# Patient Record
Sex: MALE | Race: WHITE | NOT HISPANIC OR LATINO | Employment: OTHER | URBAN - METROPOLITAN AREA
[De-identification: names, ages, dates, MRNs, and addresses within clinical notes are randomized per-mention and may not be internally consistent; named-entity substitution may affect disease eponyms.]

---

## 2017-07-12 ENCOUNTER — RECORDS - HEALTHEAST (OUTPATIENT)
Dept: LAB | Facility: CLINIC | Age: 62
End: 2017-07-12

## 2017-07-12 LAB
CHOLEST SERPL-MCNC: 129 MG/DL
FASTING STATUS PATIENT QL REPORTED: YES
HDLC SERPL-MCNC: 24 MG/DL
LDLC SERPL CALC-MCNC: 90 MG/DL
PSA SERPL-MCNC: 0.5 NG/ML (ref 0–4.5)
TRIGL SERPL-MCNC: 77 MG/DL

## 2018-08-08 ENCOUNTER — RECORDS - HEALTHEAST (OUTPATIENT)
Dept: LAB | Facility: CLINIC | Age: 63
End: 2018-08-08

## 2018-08-08 LAB
ALBUMIN SERPL-MCNC: 3.2 G/DL (ref 3.5–5)
ALP SERPL-CCNC: 156 U/L (ref 45–120)
ALT SERPL W P-5'-P-CCNC: 34 U/L (ref 0–45)
ANION GAP SERPL CALCULATED.3IONS-SCNC: 10 MMOL/L (ref 5–18)
AST SERPL W P-5'-P-CCNC: 24 U/L (ref 0–40)
BILIRUB SERPL-MCNC: 0.5 MG/DL (ref 0–1)
BUN SERPL-MCNC: 14 MG/DL (ref 8–22)
CALCIUM SERPL-MCNC: 9 MG/DL (ref 8.5–10.5)
CHLORIDE BLD-SCNC: 108 MMOL/L (ref 98–107)
CHOLEST SERPL-MCNC: 132 MG/DL
CO2 SERPL-SCNC: 23 MMOL/L (ref 22–31)
CREAT SERPL-MCNC: 0.74 MG/DL (ref 0.7–1.3)
FASTING STATUS PATIENT QL REPORTED: YES
GFR SERPL CREATININE-BSD FRML MDRD: >60 ML/MIN/1.73M2
GLUCOSE BLD-MCNC: 94 MG/DL (ref 70–125)
HDLC SERPL-MCNC: 24 MG/DL
LDLC SERPL CALC-MCNC: 95 MG/DL
POTASSIUM BLD-SCNC: 4.6 MMOL/L (ref 3.5–5)
PROT SERPL-MCNC: 5.8 G/DL (ref 6–8)
SODIUM SERPL-SCNC: 141 MMOL/L (ref 136–145)
TRIGL SERPL-MCNC: 65 MG/DL

## 2018-08-09 ENCOUNTER — RECORDS - HEALTHEAST (OUTPATIENT)
Dept: LAB | Facility: CLINIC | Age: 63
End: 2018-08-09

## 2018-08-09 LAB — GGT SERPL-CCNC: 14 U/L (ref 0–50)

## 2019-03-19 ENCOUNTER — RECORDS - HEALTHEAST (OUTPATIENT)
Dept: LAB | Facility: CLINIC | Age: 64
End: 2019-03-19

## 2019-03-19 LAB
ALBUMIN SERPL-MCNC: 2.7 G/DL (ref 3.5–5)
ALP SERPL-CCNC: 236 U/L (ref 45–120)
ALT SERPL W P-5'-P-CCNC: 133 U/L (ref 0–45)
ANION GAP SERPL CALCULATED.3IONS-SCNC: 8 MMOL/L (ref 5–18)
AST SERPL W P-5'-P-CCNC: 76 U/L (ref 0–40)
BILIRUB SERPL-MCNC: 0.4 MG/DL (ref 0–1)
BUN SERPL-MCNC: 11 MG/DL (ref 8–22)
C DIFF TOX B STL QL: NEGATIVE
CALCIUM SERPL-MCNC: 8.5 MG/DL (ref 8.5–10.5)
CHLORIDE BLD-SCNC: 104 MMOL/L (ref 98–107)
CHOLEST SERPL-MCNC: 97 MG/DL
CO2 SERPL-SCNC: 25 MMOL/L (ref 22–31)
CREAT SERPL-MCNC: 0.75 MG/DL (ref 0.7–1.3)
FASTING STATUS PATIENT QL REPORTED: ABNORMAL
GFR SERPL CREATININE-BSD FRML MDRD: >60 ML/MIN/1.73M2
GLUCOSE BLD-MCNC: 85 MG/DL (ref 70–125)
HDLC SERPL-MCNC: 17 MG/DL
LDLC SERPL CALC-MCNC: 66 MG/DL
MAGNESIUM SERPL-MCNC: 1.8 MG/DL (ref 1.8–2.6)
POTASSIUM BLD-SCNC: 4.7 MMOL/L (ref 3.5–5)
PROT SERPL-MCNC: 5.2 G/DL (ref 6–8)
PSA SERPL-MCNC: 0.5 NG/ML (ref 0–4.5)
RIBOTYPE 027/NAP1/BI: NORMAL
SODIUM SERPL-SCNC: 137 MMOL/L (ref 136–145)
TRIGL SERPL-MCNC: 70 MG/DL

## 2019-03-20 LAB
O+P STL MICRO: NORMAL
SHIGA TOXIN 1: NEGATIVE
SHIGA TOXIN 2: NEGATIVE

## 2019-03-21 LAB
H PYLORI AG STL QL IA: NORMAL
REPORT STATUS: NORMAL
SPECIMEN DESCRIPTION: NORMAL

## 2019-03-22 LAB — BACTERIA SPEC CULT: NORMAL

## 2020-09-10 ENCOUNTER — RECORDS - HEALTHEAST (OUTPATIENT)
Dept: LAB | Facility: CLINIC | Age: 65
End: 2020-09-10

## 2020-09-10 LAB
ALBUMIN SERPL-MCNC: 2.8 G/DL (ref 3.5–5)
ALP SERPL-CCNC: 215 U/L (ref 45–120)
ALT SERPL W P-5'-P-CCNC: 49 U/L (ref 0–45)
ANION GAP SERPL CALCULATED.3IONS-SCNC: 8 MMOL/L (ref 5–18)
AST SERPL W P-5'-P-CCNC: 37 U/L (ref 0–40)
BILIRUB SERPL-MCNC: 0.3 MG/DL (ref 0–1)
BUN SERPL-MCNC: 13 MG/DL (ref 8–22)
CALCIUM SERPL-MCNC: 7.8 MG/DL (ref 8.5–10.5)
CHLORIDE BLD-SCNC: 106 MMOL/L (ref 98–107)
CO2 SERPL-SCNC: 22 MMOL/L (ref 22–31)
CREAT SERPL-MCNC: 0.69 MG/DL (ref 0.7–1.3)
GFR SERPL CREATININE-BSD FRML MDRD: >60 ML/MIN/1.73M2
GLUCOSE BLD-MCNC: 81 MG/DL (ref 70–125)
LIPASE SERPL-CCNC: 25 U/L (ref 0–52)
POTASSIUM BLD-SCNC: 4.4 MMOL/L (ref 3.5–5)
PROT SERPL-MCNC: 5.5 G/DL (ref 6–8)
PSA SERPL-MCNC: 0.4 NG/ML (ref 0–4.5)
SODIUM SERPL-SCNC: 136 MMOL/L (ref 136–145)

## 2020-09-11 ENCOUNTER — RECORDS - HEALTHEAST (OUTPATIENT)
Dept: LAB | Facility: CLINIC | Age: 65
End: 2020-09-11

## 2020-09-11 LAB
CHOLEST SERPL-MCNC: 115 MG/DL
FASTING STATUS PATIENT QL REPORTED: ABNORMAL
HDLC SERPL-MCNC: 29 MG/DL
LDLC SERPL CALC-MCNC: 78 MG/DL
TRIGL SERPL-MCNC: 42 MG/DL

## 2021-05-30 ENCOUNTER — RECORDS - HEALTHEAST (OUTPATIENT)
Dept: ADMINISTRATIVE | Facility: CLINIC | Age: 66
End: 2021-05-30

## 2021-09-14 ENCOUNTER — LAB REQUISITION (OUTPATIENT)
Dept: LAB | Facility: CLINIC | Age: 66
End: 2021-09-14
Payer: COMMERCIAL

## 2021-09-14 DIAGNOSIS — E78.5 HYPERLIPIDEMIA, UNSPECIFIED: ICD-10-CM

## 2021-09-14 DIAGNOSIS — Z12.5 ENCOUNTER FOR SCREENING FOR MALIGNANT NEOPLASM OF PROSTATE: ICD-10-CM

## 2021-09-14 DIAGNOSIS — I10 ESSENTIAL (PRIMARY) HYPERTENSION: ICD-10-CM

## 2021-09-14 LAB
ALBUMIN SERPL-MCNC: 2.8 G/DL (ref 3.5–5)
ALP SERPL-CCNC: 179 U/L (ref 45–120)
ALT SERPL W P-5'-P-CCNC: 36 U/L (ref 0–45)
ANION GAP SERPL CALCULATED.3IONS-SCNC: 8 MMOL/L (ref 5–18)
AST SERPL W P-5'-P-CCNC: 30 U/L (ref 0–40)
BILIRUB SERPL-MCNC: 0.3 MG/DL (ref 0–1)
BUN SERPL-MCNC: 8 MG/DL (ref 8–22)
CALCIUM SERPL-MCNC: 8 MG/DL (ref 8.5–10.5)
CHLORIDE BLD-SCNC: 106 MMOL/L (ref 98–107)
CHOLEST SERPL-MCNC: 125 MG/DL
CO2 SERPL-SCNC: 25 MMOL/L (ref 22–31)
CREAT SERPL-MCNC: 0.73 MG/DL (ref 0.7–1.3)
GFR SERPL CREATININE-BSD FRML MDRD: >90 ML/MIN/1.73M2
GLUCOSE BLD-MCNC: 90 MG/DL (ref 70–125)
HDLC SERPL-MCNC: 35 MG/DL
LDLC SERPL CALC-MCNC: 82 MG/DL
POTASSIUM BLD-SCNC: 4.4 MMOL/L (ref 3.5–5)
PROT SERPL-MCNC: 5.5 G/DL (ref 6–8)
PSA SERPL-MCNC: 0.62 UG/L (ref 0–4.5)
SODIUM SERPL-SCNC: 139 MMOL/L (ref 136–145)
TRIGL SERPL-MCNC: 41 MG/DL

## 2021-09-14 PROCEDURE — 36415 COLL VENOUS BLD VENIPUNCTURE: CPT | Mod: ORL | Performed by: NURSE PRACTITIONER

## 2021-09-14 PROCEDURE — 80061 LIPID PANEL: CPT | Mod: ORL | Performed by: NURSE PRACTITIONER

## 2021-09-14 PROCEDURE — 80053 COMPREHEN METABOLIC PANEL: CPT | Mod: ORL | Performed by: NURSE PRACTITIONER

## 2021-09-14 PROCEDURE — G0103 PSA SCREENING: HCPCS | Mod: ORL | Performed by: NURSE PRACTITIONER

## 2022-09-09 ENCOUNTER — LAB REQUISITION (OUTPATIENT)
Dept: LAB | Facility: CLINIC | Age: 67
End: 2022-09-09
Payer: COMMERCIAL

## 2022-09-09 DIAGNOSIS — R05.1 ACUTE COUGH: ICD-10-CM

## 2022-09-09 LAB — SARS-COV-2 RNA RESP QL NAA+PROBE: NEGATIVE

## 2022-09-09 PROCEDURE — 87081 CULTURE SCREEN ONLY: CPT | Mod: ORL | Performed by: PHYSICIAN ASSISTANT

## 2022-09-09 PROCEDURE — U0003 INFECTIOUS AGENT DETECTION BY NUCLEIC ACID (DNA OR RNA); SEVERE ACUTE RESPIRATORY SYNDROME CORONAVIRUS 2 (SARS-COV-2) (CORONAVIRUS DISEASE [COVID-19]), AMPLIFIED PROBE TECHNIQUE, MAKING USE OF HIGH THROUGHPUT TECHNOLOGIES AS DESCRIBED BY CMS-2020-01-R: HCPCS | Mod: ORL | Performed by: PHYSICIAN ASSISTANT

## 2022-09-11 LAB — BACTERIA SPEC CULT: NORMAL

## 2022-09-20 ENCOUNTER — LAB REQUISITION (OUTPATIENT)
Dept: LAB | Facility: CLINIC | Age: 67
End: 2022-09-20
Payer: COMMERCIAL

## 2022-09-20 ENCOUNTER — TRANSFERRED RECORDS (OUTPATIENT)
Dept: HEALTH INFORMATION MANAGEMENT | Facility: CLINIC | Age: 67
End: 2022-09-20

## 2022-09-20 DIAGNOSIS — Z12.5 ENCOUNTER FOR SCREENING FOR MALIGNANT NEOPLASM OF PROSTATE: ICD-10-CM

## 2022-09-20 DIAGNOSIS — E78.5 HYPERLIPIDEMIA, UNSPECIFIED: ICD-10-CM

## 2022-09-20 LAB
ALBUMIN SERPL BCG-MCNC: 3.6 G/DL (ref 3.5–5.2)
ALP SERPL-CCNC: 140 U/L (ref 40–129)
ALT SERPL W P-5'-P-CCNC: 22 U/L (ref 10–50)
ANION GAP SERPL CALCULATED.3IONS-SCNC: 6 MMOL/L (ref 7–15)
AST SERPL W P-5'-P-CCNC: 18 U/L (ref 10–50)
BILIRUB SERPL-MCNC: 0.2 MG/DL
BUN SERPL-MCNC: 12.8 MG/DL (ref 8–23)
CALCIUM SERPL-MCNC: 8.8 MG/DL (ref 8.8–10.2)
CHLORIDE SERPL-SCNC: 104 MMOL/L (ref 98–107)
CHOLEST SERPL-MCNC: 147 MG/DL
CREAT SERPL-MCNC: 0.77 MG/DL (ref 0.67–1.17)
DEPRECATED HCO3 PLAS-SCNC: 29 MMOL/L (ref 22–29)
GFR SERPL CREATININE-BSD FRML MDRD: >90 ML/MIN/1.73M2
GLUCOSE SERPL-MCNC: 96 MG/DL (ref 70–99)
HDLC SERPL-MCNC: 44 MG/DL
LDLC SERPL CALC-MCNC: 95 MG/DL
NONHDLC SERPL-MCNC: 103 MG/DL
POTASSIUM SERPL-SCNC: 4.7 MMOL/L (ref 3.4–5.3)
PROT SERPL-MCNC: 6.1 G/DL (ref 6.4–8.3)
PSA SERPL-MCNC: 0.58 NG/ML (ref 0–4.5)
SODIUM SERPL-SCNC: 139 MMOL/L (ref 136–145)
TRIGL SERPL-MCNC: 41 MG/DL

## 2022-09-20 PROCEDURE — G0103 PSA SCREENING: HCPCS | Mod: ORL | Performed by: NURSE PRACTITIONER

## 2022-09-20 PROCEDURE — 80061 LIPID PANEL: CPT | Mod: ORL | Performed by: NURSE PRACTITIONER

## 2022-09-20 PROCEDURE — 80053 COMPREHEN METABOLIC PANEL: CPT | Mod: ORL | Performed by: NURSE PRACTITIONER

## 2022-09-21 ENCOUNTER — LAB REQUISITION (OUTPATIENT)
Dept: LAB | Facility: CLINIC | Age: 67
End: 2022-09-21
Payer: COMMERCIAL

## 2022-09-21 DIAGNOSIS — R74.8 ABNORMAL LEVELS OF OTHER SERUM ENZYMES: ICD-10-CM

## 2022-09-27 ENCOUNTER — TRANSFERRED RECORDS (OUTPATIENT)
Dept: HEALTH INFORMATION MANAGEMENT | Facility: CLINIC | Age: 67
End: 2022-09-27

## 2022-09-27 LAB — EJECTION FRACTION: 64 %

## 2022-10-07 ENCOUNTER — MEDICAL CORRESPONDENCE (OUTPATIENT)
Dept: HEALTH INFORMATION MANAGEMENT | Facility: CLINIC | Age: 67
End: 2022-10-07

## 2022-10-11 ENCOUNTER — LAB REQUISITION (OUTPATIENT)
Dept: LAB | Facility: CLINIC | Age: 67
End: 2022-10-11
Payer: COMMERCIAL

## 2022-10-11 DIAGNOSIS — R74.8 ABNORMAL LEVELS OF OTHER SERUM ENZYMES: ICD-10-CM

## 2022-10-11 LAB
GGT SERPL-CCNC: 11 U/L (ref 8–61)
HBV SURFACE AB SERPL IA-ACNC: 0 M[IU]/ML
HBV SURFACE AB SERPL IA-ACNC: NONREACTIVE M[IU]/ML
HBV SURFACE AG SERPL QL IA: NONREACTIVE
HCV AB SERPL QL IA: NONREACTIVE

## 2022-10-11 PROCEDURE — 86706 HEP B SURFACE ANTIBODY: CPT | Mod: ORL | Performed by: NURSE PRACTITIONER

## 2022-10-11 PROCEDURE — 82977 ASSAY OF GGT: CPT | Mod: ORL | Performed by: NURSE PRACTITIONER

## 2022-10-11 PROCEDURE — 86803 HEPATITIS C AB TEST: CPT | Mod: ORL | Performed by: NURSE PRACTITIONER

## 2022-10-11 PROCEDURE — 87340 HEPATITIS B SURFACE AG IA: CPT | Mod: ORL | Performed by: NURSE PRACTITIONER

## 2022-10-24 ENCOUNTER — OFFICE VISIT (OUTPATIENT)
Dept: CARDIOLOGY | Facility: CLINIC | Age: 67
End: 2022-10-24
Payer: COMMERCIAL

## 2022-10-24 ENCOUNTER — HOSPITAL ENCOUNTER (OUTPATIENT)
Dept: CARDIOLOGY | Facility: CLINIC | Age: 67
Discharge: HOME | End: 2022-10-24
Attending: INTERNAL MEDICINE | Admitting: INTERNAL MEDICINE
Payer: COMMERCIAL

## 2022-10-24 VITALS
SYSTOLIC BLOOD PRESSURE: 130 MMHG | DIASTOLIC BLOOD PRESSURE: 66 MMHG | WEIGHT: 244 LBS | HEART RATE: 52 BPM | RESPIRATION RATE: 16 BRPM

## 2022-10-24 DIAGNOSIS — I10 ESSENTIAL HYPERTENSION: ICD-10-CM

## 2022-10-24 DIAGNOSIS — I48.0 PAROXYSMAL ATRIAL FIBRILLATION (H): ICD-10-CM

## 2022-10-24 DIAGNOSIS — E66.01 MORBID OBESITY (H): ICD-10-CM

## 2022-10-24 DIAGNOSIS — I48.0 PAROXYSMAL ATRIAL FIBRILLATION (H): Primary | ICD-10-CM

## 2022-10-24 PROCEDURE — 93226 XTRNL ECG REC<48 HR SCAN A/R: CPT

## 2022-10-24 PROCEDURE — 99204 OFFICE O/P NEW MOD 45 MIN: CPT | Performed by: INTERNAL MEDICINE

## 2022-10-24 RX ORDER — PROPRANOLOL HYDROCHLORIDE 40 MG/1
40 TABLET ORAL 3 TIMES DAILY
Status: ON HOLD | COMMUNITY
End: 2023-09-05

## 2022-10-24 RX ORDER — LACTOBACILLUS RHAMNOSUS GG 10B CELL
1 CAPSULE ORAL DAILY
COMMUNITY

## 2022-10-24 RX ORDER — FLUTICASONE PROPIONATE 50 MCG
1 SPRAY, SUSPENSION (ML) NASAL DAILY
COMMUNITY
End: 2023-08-29

## 2022-10-24 RX ORDER — TAMSULOSIN HYDROCHLORIDE 0.4 MG/1
0.4 CAPSULE ORAL EVERY EVENING
COMMUNITY

## 2022-10-24 NOTE — PATIENT INSTRUCTIONS
Use your stationary bike to get 150 minutes of moderate aerobic exercise in each week, at least.  More exercise may help with weight loss  No medication changes today.  We will check a 24-hour Holter monitor to make sure your heart rate is reasonably controlled and not too slow.  Ride your stationary bike while you are wearing the monitor, and complete the diary.  You would be at very low risk for stroke coming off of Eliquis for 2 days prior to dental surgery, or 3 days prior to knee surgery.

## 2022-10-24 NOTE — LETTER
10/24/2022    Inge Alfaro, DAVIDE  [address] [address]    RE: Josue Crook       Dear Colleague,     I had the pleasure of seeing Josue Crook in the [facility].    CARDIOLOGY CLINIC CONSULT NOTE     Assessment/Plan:   1.  Paroxysmal atrial fibrillation.  Apparently brief episodes asymptomatic except as detected by his watch.  Documented on outside echocardiographic study.  With his [id number] score of at least 2, he does merit long-term anticoagulation.  We discussed that given the infrequent and brief nature of these episodes he would be at low risk to come off of anticoagulation temporarily for procedures.  We discussed that if he has frequent recurrences or more prolonged recurrences, interventions such as pharmacologic suppression or an ablation procedure might be indicated.  2.  Sinus bradycardia.  We will check a 24-hour Holter monitor on his decreased dose of propranolol to look for excessive bradycardia that may warrant a change in approach.  3.  Essential hypertension well-controlled on decreased dose of propranolol.  4.  Obesity, deconditioning.  Encouraged getting back to regular moderate exercise regimen and work on weight loss, as well as close monitoring for the sense of sleep apnea which may be contributing to his atrial fibrillation.    Follow-up 1 year or for significant abnormalities on Holter monitoring.     History of Present Illness:     It is my pleasure to see Josue Crook at the [facility] clinic for evaluation of paroxysmal atrial fibrillation..    Josue Crook is a 67 year old male with a past medical history of morbid obesity and hypertension, maintained on propranolol 60 mg twice daily, has been experiencing some lightheadedness recently.  He had 1 fall a month ago where he felt very lightheaded standing quickly.  Subsequent ECG showed sinus bradycardia at 50 beats a minute, and his metoprolol was decreased to 40 mg  twice daily.  He has also had about 6 episodes on his apple watch over the last year and a half where atrial fibrillation was reported, these episodes lasted seconds to a few minutes and then rhythm apparently returned to normal.  An echocardiogram was scheduled, and reportedly during the echocardiographic examination atrial fibrillation was noted.  The echo study showed left atrial enlargement but was otherwise normal.    He has no awareness of palpitations, syncope, or chest pain.  He has no history of stroke.  He quit alcohol 15 years ago and smoking 10 years ago.  He has no history of diabetes mellitus or diagnosis of sleep apnea or vascular disease.  He reports being asymptomatic during the episodes of atrial fibrillation reported by his watch.  Also has plans for dental surgery as well as knee surgery coming in the next few months    Past Medical History:   There is no problem list on file for this patient.      Past Surgical History:   No past surgical history on file.    Family History:   No family history on file.  Family history reviewed and is not pertinent to the chief complaint or presenting problem    Social History:    reports that he has quit smoking. His smoking use included cigarettes. He has never used smokeless tobacco.    Exercise: Occasionally walks, limited by knee pain.  Contemplating total knee replacement.  Has a stationary bike at home that he does not use.    Sleep: Generally restorative.  No snoring or apnea reported.  Rarely naps    Meds:     Current Outpatient Medications   Medication Sig Dispense Refill     apixaban ANTICOAGULANT (ELIQUIS) 5 MG tablet Take 5 mg by mouth 2 times daily One in the morning and one in the night       fluticasone (FLONASE) 50 MCG/ACT nasal spray Spray 1 spray into both nostrils daily       lactobacillus rhamnosus, GG, (CULTURELL) capsule Take 1 capsule by mouth 2 times daily       Multiple Vitamins-Minerals (MULTIVITAL PO)        propranolol (INDERAL) 40 MG  tablet Take 40 mg by mouth 3 times daily       tamsulosin (FLOMAX) 0.4 MG capsule Take 0.4 mg by mouth daily         Allergies:   Patient has no known allergies.    Review of Systems:     Positive for shortness of breath with activity, leg swelling, joint pains, muscle weakness, muscle pain, poor wound healing, easy bruising, easy bleeding.  12 point review of systems otherwise negative.     Please refer above for cardiac ROS details.       Objective:      Physical Exam  110.7 kg (244 lb)     There is no height or weight on file to calculate BMI.  /66 (BP Location: Left arm, Patient Position: Sitting, Cuff Size: Adult Large)   Pulse 52   Resp 16   Wt 110.7 kg (244 lb)         General Appearance : Awake, Alert, No acute distress  HEENT: No Scleral icterus; the mucous membranes were pink and moist.  Conjunctivae not injected  Neck: StOut.  No cervical bruits, jugular venous distention, or thyromegaly   Chest: The spine was straight. Chest wall symmetric  Lungs: Respirations unlabored; the lungs are clear to auscultation.  No wheezing   Cardiovascular:   Normal point of maximal impulse.  Auscultation reveals regular, slow first and second heart sounds with no murmurs, rubs, or gallops.  Carotid, radial, and dorsalis pedal pulses are intact and symmetric.  Abdomen: Obese.  No organomegaly, masses, bruits, or tenderness. Bowels sounds are present  Extremities: No edema.  Thick.  Skin: No xanthelasma. Warm, Dry.  Musculoskeletal: No tenderness.  Neurologic: Alert and oriented ×3. Speech is fluent.      EKG (personally reviewed):  9/20/2022 at [facility].  Poor quality faxed copy suggest sinus bradycardia at 50 beats a minute.  Otherwise normal study.    Cardiac Imaging Studies:  Echocardiogram at [facility] 9/27/2022: Reported to show atrial fibrillation.  Normal left ventricular ejection fraction at 64%, left atrial enlargement with no significant valvular abnormalities.    Lab Review   Lab Results   Component Value  Date     09/20/2022    CO2 29 09/20/2022    CO2 25 09/14/2021    BUN 12.8 09/20/2022    BUN 8 09/14/2021     No results found for: WBC, HGB, HCT, MCV, PLT  Lab Results   Component Value Date    CHOL 147 09/20/2022    TRIG 41 09/20/2022    HDL 44 09/20/2022     No results found for: TROPONINI  No results found for: BNP  No results found for: TSH    Raleigh Feng MD, MD [facility]      This note created using Dragon voice recognition software. Sound alike errors may have escaped editing.       Thank you for allowing me to participate in the care of your patient.      Sincerely,     Raleigh Feng MD     [facility]  cc:   No referring provider defined for this encounter.

## 2022-10-24 NOTE — PROGRESS NOTES
CARDIOLOGY CLINIC CONSULT NOTE     Assessment/Plan:   1.  Paroxysmal atrial fibrillation.  Apparently brief episodes asymptomatic except as detected by his watch.  Documented on outside echocardiographic study.  With his [id number]-AUWk score of at least 2, he does merit long-term anticoagulation.  We discussed that given the infrequent and brief nature of these episodes he would be at low risk to come off of anticoagulation temporarily for procedures.  We discussed that if he has frequent recurrences or more prolonged recurrences, interventions such as pharmacologic suppression or an ablation procedure might be indicated.  2.  Sinus bradycardia.  We will check a 24-hour Holter monitor on his decreased dose of propranolol to look for excessive bradycardia that may warrant a change in approach.  3.  Essential hypertension well-controlled on decreased dose of propranolol.  4.  Obesity, deconditioning.  Encouraged getting back to regular moderate exercise regimen and work on weight loss, as well as close monitoring for the sense of sleep apnea which may be contributing to his atrial fibrillation.    Follow-up 1 year or for significant abnormalities on Holter monitoring.     History of Present Illness:     It is my pleasure to see Josue Crook at the [facility] clinic for evaluation of paroxysmal atrial fibrillation..    Josue Crook is a 67 year old male with a past medical history of morbid obesity and hypertension, maintained on propranolol 60 mg twice daily, has been experiencing some lightheadedness recently.  He had 1 fall a month ago where he felt very lightheaded standing quickly.  Subsequent ECG showed sinus bradycardia at 50 beats a minute, and his metoprolol was decreased to 40 mg twice daily.  He has also had about 6 episodes on his apple watch over the last year and a half where atrial fibrillation was reported, these episodes lasted seconds to a few minutes and then rhythm  apparently returned to normal.  An echocardiogram was scheduled, and reportedly during the echocardiographic examination atrial fibrillation was noted.  The echo study showed left atrial enlargement but was otherwise normal.    He has no awareness of palpitations, syncope, or chest pain.  He has no history of stroke.  He quit alcohol 15 years ago and smoking 10 years ago.  He has no history of diabetes mellitus or diagnosis of sleep apnea or vascular disease.  He reports being asymptomatic during the episodes of atrial fibrillation reported by his watch.  Also has plans for dental surgery as well as knee surgery coming in the next few months    Past Medical History:   There is no problem list on file for this patient.      Past Surgical History:   No past surgical history on file.    Family History:   No family history on file.  Family history reviewed and is not pertinent to the chief complaint or presenting problem    Social History:    reports that he has quit smoking. His smoking use included cigarettes. He has never used smokeless tobacco.    Exercise: Occasionally walks, limited by knee pain.  Contemplating total knee replacement.  Has a stationary bike at home that he does not use.    Sleep: Generally restorative.  No snoring or apnea reported.  Rarely naps    Meds:     Current Outpatient Medications   Medication Sig Dispense Refill     apixaban ANTICOAGULANT (ELIQUIS) 5 MG tablet Take 5 mg by mouth 2 times daily One in the morning and one in the night       fluticasone (FLONASE) 50 MCG/ACT nasal spray Spray 1 spray into both nostrils daily       lactobacillus rhamnosus, GG, (CULTURELL) capsule Take 1 capsule by mouth 2 times daily       Multiple Vitamins-Minerals (MULTIVITAL PO)        propranolol (INDERAL) 40 MG tablet Take 40 mg by mouth 3 times daily       tamsulosin (FLOMAX) 0.4 MG capsule Take 0.4 mg by mouth daily         Allergies:   Patient has no known allergies.    Review of Systems:     Positive  for shortness of breath with activity, leg swelling, joint pains, muscle weakness, muscle pain, poor wound healing, easy bruising, easy bleeding.  12 point review of systems otherwise negative.     Please refer above for cardiac ROS details.       Objective:      Physical Exam  110.7 kg (244 lb)     There is no height or weight on file to calculate BMI.  /66 (BP Location: Left arm, Patient Position: Sitting, Cuff Size: Adult Large)   Pulse 52   Resp 16   Wt 110.7 kg (244 lb)         General Appearance : Awake, Alert, No acute distress  HEENT: No Scleral icterus; the mucous membranes were pink and moist.  Conjunctivae not injected  Neck: StOut.  No cervical bruits, jugular venous distention, or thyromegaly   Chest: The spine was straight. Chest wall symmetric  Lungs: Respirations unlabored; the lungs are clear to auscultation.  No wheezing   Cardiovascular:   Normal point of maximal impulse.  Auscultation reveals regular, slow first and second heart sounds with no murmurs, rubs, or gallops.  Carotid, radial, and dorsalis pedal pulses are intact and symmetric.  Abdomen: Obese.  No organomegaly, masses, bruits, or tenderness. Bowels sounds are present  Extremities: No edema.  Thick.  Skin: No xanthelasma. Warm, Dry.  Musculoskeletal: No tenderness.  Neurologic: Alert and oriented ×3. Speech is fluent.      EKG (personally reviewed):  9/20/2022 at [facility].  Poor quality faxed copy suggest sinus bradycardia at 50 beats a minute.  Otherwise normal study.    Cardiac Imaging Studies:  Echocardiogram at [facility] 9/27/2022: Reported to show atrial fibrillation.  Normal left ventricular ejection fraction at 64%, left atrial enlargement with no significant valvular abnormalities.    Lab Review   Lab Results   Component Value Date     09/20/2022    CO2 29 09/20/2022    CO2 25 09/14/2021    BUN 12.8 09/20/2022    BUN 8 09/14/2021     No results found for: WBC, HGB, HCT, MCV, PLT  Lab Results   Component Value Date     CHOL 147 09/20/2022    TRIG 41 09/20/2022    HDL 44 09/20/2022     No results found for: TROPONINI  No results found for: BNP  No results found for: TSH    Raleigh Feng MD, MD [facility]      This note created using Dragon voice recognition software. Sound alike errors may have escaped editing.

## 2022-10-31 DIAGNOSIS — I10 ESSENTIAL HYPERTENSION: ICD-10-CM

## 2022-10-31 DIAGNOSIS — E66.01 MORBID OBESITY (H): ICD-10-CM

## 2022-10-31 DIAGNOSIS — I48.0 PAROXYSMAL ATRIAL FIBRILLATION (H): Primary | ICD-10-CM

## 2022-10-31 PROCEDURE — 93227 XTRNL ECG REC<48 HR R&I: CPT | Performed by: INTERNAL MEDICINE

## 2022-12-21 ENCOUNTER — LAB REQUISITION (OUTPATIENT)
Dept: LAB | Facility: CLINIC | Age: 67
End: 2022-12-21
Payer: COMMERCIAL

## 2022-12-21 DIAGNOSIS — I10 ESSENTIAL (PRIMARY) HYPERTENSION: ICD-10-CM

## 2022-12-21 DIAGNOSIS — Z01.818 ENCOUNTER FOR OTHER PREPROCEDURAL EXAMINATION: ICD-10-CM

## 2022-12-21 LAB
ANION GAP SERPL CALCULATED.3IONS-SCNC: 12 MMOL/L (ref 7–15)
BUN SERPL-MCNC: 8.8 MG/DL (ref 8–23)
CALCIUM SERPL-MCNC: 9.1 MG/DL (ref 8.8–10.2)
CHLORIDE SERPL-SCNC: 103 MMOL/L (ref 98–107)
CREAT SERPL-MCNC: 0.79 MG/DL (ref 0.67–1.17)
DEPRECATED HCO3 PLAS-SCNC: 26 MMOL/L (ref 22–29)
ERYTHROCYTE [DISTWIDTH] IN BLOOD BY AUTOMATED COUNT: 16.1 % (ref 10–15)
GFR SERPL CREATININE-BSD FRML MDRD: >90 ML/MIN/1.73M2
GLUCOSE SERPL-MCNC: 131 MG/DL (ref 70–99)
HCT VFR BLD AUTO: 35.3 % (ref 40–53)
HGB BLD-MCNC: 10.1 G/DL (ref 13.3–17.7)
MCH RBC QN AUTO: 24.2 PG (ref 26.5–33)
MCHC RBC AUTO-ENTMCNC: 28.6 G/DL (ref 31.5–36.5)
MCV RBC AUTO: 85 FL (ref 78–100)
PLATELET # BLD AUTO: 375 10E3/UL (ref 150–450)
POTASSIUM SERPL-SCNC: 4.3 MMOL/L (ref 3.4–5.3)
RBC # BLD AUTO: 4.17 10E6/UL (ref 4.4–5.9)
SODIUM SERPL-SCNC: 141 MMOL/L (ref 136–145)
WBC # BLD AUTO: 8.7 10E3/UL (ref 4–11)

## 2022-12-21 PROCEDURE — 80048 BASIC METABOLIC PNL TOTAL CA: CPT | Mod: ORL | Performed by: NURSE PRACTITIONER

## 2022-12-21 PROCEDURE — 85027 COMPLETE CBC AUTOMATED: CPT | Mod: ORL | Performed by: NURSE PRACTITIONER

## 2022-12-27 ENCOUNTER — LAB REQUISITION (OUTPATIENT)
Dept: LAB | Facility: CLINIC | Age: 67
End: 2022-12-27
Payer: COMMERCIAL

## 2022-12-27 DIAGNOSIS — Z01.818 ENCOUNTER FOR OTHER PREPROCEDURAL EXAMINATION: ICD-10-CM

## 2022-12-27 LAB
BASOPHILS # BLD AUTO: 0 10E3/UL (ref 0–0.2)
BASOPHILS NFR BLD AUTO: 0 %
EOSINOPHIL # BLD AUTO: 0 10E3/UL (ref 0–0.7)
EOSINOPHIL NFR BLD AUTO: 0 %
ERYTHROCYTE [DISTWIDTH] IN BLOOD BY AUTOMATED COUNT: 16.2 % (ref 10–15)
FERRITIN SERPL-MCNC: 8 NG/ML (ref 31–409)
HCT VFR BLD AUTO: 35 % (ref 40–53)
HGB BLD-MCNC: 9.9 G/DL (ref 13.3–17.7)
IMM GRANULOCYTES # BLD: 0 10E3/UL
IMM GRANULOCYTES NFR BLD: 0 %
IRON BINDING CAPACITY (ROCHE): 359 UG/DL (ref 240–430)
IRON SATN MFR SERPL: 6 % (ref 15–46)
IRON SERPL-MCNC: 21 UG/DL (ref 61–157)
LYMPHOCYTES # BLD AUTO: 1.4 10E3/UL (ref 0.8–5.3)
LYMPHOCYTES NFR BLD AUTO: 19 %
MCH RBC QN AUTO: 24 PG (ref 26.5–33)
MCHC RBC AUTO-ENTMCNC: 28.3 G/DL (ref 31.5–36.5)
MCV RBC AUTO: 85 FL (ref 78–100)
MONOCYTES # BLD AUTO: 1.1 10E3/UL (ref 0–1.3)
MONOCYTES NFR BLD AUTO: 15 %
NEUTROPHILS # BLD AUTO: 4.6 10E3/UL (ref 1.6–8.3)
NEUTROPHILS NFR BLD AUTO: 66 %
NRBC # BLD AUTO: 0 10E3/UL
NRBC BLD AUTO-RTO: 0 /100
PLATELET # BLD AUTO: 350 10E3/UL (ref 150–450)
RBC # BLD AUTO: 4.13 10E6/UL (ref 4.4–5.9)
RETICS # AUTO: 0.06 10E6/UL (ref 0.03–0.1)
RETICS/RBC NFR AUTO: 1.6 % (ref 0.5–2)
VIT B12 SERPL-MCNC: 771 PG/ML (ref 232–1245)
WBC # BLD AUTO: 7.1 10E3/UL (ref 4–11)

## 2022-12-27 PROCEDURE — 83550 IRON BINDING TEST: CPT | Mod: ORL | Performed by: NURSE PRACTITIONER

## 2022-12-27 PROCEDURE — 85045 AUTOMATED RETICULOCYTE COUNT: CPT | Mod: ORL | Performed by: NURSE PRACTITIONER

## 2022-12-27 PROCEDURE — 82607 VITAMIN B-12: CPT | Mod: ORL | Performed by: NURSE PRACTITIONER

## 2022-12-27 PROCEDURE — 85025 COMPLETE CBC W/AUTO DIFF WBC: CPT | Mod: ORL | Performed by: NURSE PRACTITIONER

## 2022-12-27 PROCEDURE — 82728 ASSAY OF FERRITIN: CPT | Mod: ORL | Performed by: NURSE PRACTITIONER

## 2022-12-30 LAB
PATH REPORT.COMMENTS IMP SPEC: NORMAL
PATH REPORT.FINAL DX SPEC: NORMAL
PATH REPORT.MICROSCOPIC SPEC OTHER STN: NORMAL
PATH REPORT.RELEVANT HX SPEC: NORMAL

## 2022-12-30 PROCEDURE — 99207 BLOOD MORPHOLOGY PATHOLOGIST REVIEW: CPT | Performed by: PATHOLOGY

## 2023-02-04 ENCOUNTER — HEALTH MAINTENANCE LETTER (OUTPATIENT)
Age: 68
End: 2023-02-04

## 2023-04-26 ENCOUNTER — LAB REQUISITION (OUTPATIENT)
Dept: LAB | Facility: CLINIC | Age: 68
End: 2023-04-26
Payer: COMMERCIAL

## 2023-04-26 DIAGNOSIS — D50.9 IRON DEFICIENCY ANEMIA, UNSPECIFIED: ICD-10-CM

## 2023-04-26 DIAGNOSIS — I10 ESSENTIAL (PRIMARY) HYPERTENSION: ICD-10-CM

## 2023-04-26 LAB
ANION GAP SERPL CALCULATED.3IONS-SCNC: 12 MMOL/L (ref 7–15)
BUN SERPL-MCNC: 11.7 MG/DL (ref 8–23)
CALCIUM SERPL-MCNC: 9.2 MG/DL (ref 8.8–10.2)
CHLORIDE SERPL-SCNC: 103 MMOL/L (ref 98–107)
CREAT SERPL-MCNC: 0.8 MG/DL (ref 0.67–1.17)
DEPRECATED HCO3 PLAS-SCNC: 24 MMOL/L (ref 22–29)
ERYTHROCYTE [DISTWIDTH] IN BLOOD BY AUTOMATED COUNT: 15.7 % (ref 10–15)
GFR SERPL CREATININE-BSD FRML MDRD: >90 ML/MIN/1.73M2
GLUCOSE SERPL-MCNC: 113 MG/DL (ref 70–99)
HCT VFR BLD AUTO: 45.1 % (ref 40–53)
HGB BLD-MCNC: 13.6 G/DL (ref 13.3–17.7)
MCH RBC QN AUTO: 28.2 PG (ref 26.5–33)
MCHC RBC AUTO-ENTMCNC: 30.2 G/DL (ref 31.5–36.5)
MCV RBC AUTO: 93 FL (ref 78–100)
PLATELET # BLD AUTO: 309 10E3/UL (ref 150–450)
POTASSIUM SERPL-SCNC: 4.4 MMOL/L (ref 3.4–5.3)
RBC # BLD AUTO: 4.83 10E6/UL (ref 4.4–5.9)
SODIUM SERPL-SCNC: 139 MMOL/L (ref 136–145)
WBC # BLD AUTO: 9.2 10E3/UL (ref 4–11)

## 2023-04-26 PROCEDURE — 80048 BASIC METABOLIC PNL TOTAL CA: CPT | Mod: ORL | Performed by: NURSE PRACTITIONER

## 2023-04-26 PROCEDURE — 85027 COMPLETE CBC AUTOMATED: CPT | Mod: ORL | Performed by: NURSE PRACTITIONER

## 2023-05-03 ENCOUNTER — TRANSFERRED RECORDS (OUTPATIENT)
Dept: HEALTH INFORMATION MANAGEMENT | Facility: CLINIC | Age: 68
End: 2023-05-03

## 2023-06-28 ENCOUNTER — LAB REQUISITION (OUTPATIENT)
Dept: LAB | Facility: CLINIC | Age: 68
End: 2023-06-28
Payer: COMMERCIAL

## 2023-06-28 DIAGNOSIS — E55.9 VITAMIN D DEFICIENCY, UNSPECIFIED: ICD-10-CM

## 2023-06-28 PROCEDURE — 82306 VITAMIN D 25 HYDROXY: CPT | Mod: ORL | Performed by: NURSE PRACTITIONER

## 2023-06-29 LAB — DEPRECATED CALCIDIOL+CALCIFEROL SERPL-MC: 27 UG/L (ref 20–75)

## 2023-08-22 ENCOUNTER — LAB REQUISITION (OUTPATIENT)
Dept: LAB | Facility: CLINIC | Age: 68
End: 2023-08-22
Payer: COMMERCIAL

## 2023-08-22 DIAGNOSIS — Z12.5 ENCOUNTER FOR SCREENING FOR MALIGNANT NEOPLASM OF PROSTATE: ICD-10-CM

## 2023-08-22 DIAGNOSIS — I10 ESSENTIAL (PRIMARY) HYPERTENSION: ICD-10-CM

## 2023-08-22 DIAGNOSIS — Z01.818 ENCOUNTER FOR OTHER PREPROCEDURAL EXAMINATION: ICD-10-CM

## 2023-08-22 DIAGNOSIS — E78.5 HYPERLIPIDEMIA, UNSPECIFIED: ICD-10-CM

## 2023-08-22 LAB
ANION GAP SERPL CALCULATED.3IONS-SCNC: 11 MMOL/L (ref 7–15)
BUN SERPL-MCNC: 10.1 MG/DL (ref 8–23)
CALCIUM SERPL-MCNC: 8.7 MG/DL (ref 8.8–10.2)
CHLORIDE SERPL-SCNC: 104 MMOL/L (ref 98–107)
CREAT SERPL-MCNC: 0.81 MG/DL (ref 0.67–1.17)
DEPRECATED HCO3 PLAS-SCNC: 25 MMOL/L (ref 22–29)
ERYTHROCYTE [DISTWIDTH] IN BLOOD BY AUTOMATED COUNT: 13.2 % (ref 10–15)
GFR SERPL CREATININE-BSD FRML MDRD: >90 ML/MIN/1.73M2
GLUCOSE SERPL-MCNC: 111 MG/DL (ref 70–99)
HCT VFR BLD AUTO: 42.5 % (ref 40–53)
HGB BLD-MCNC: 13.4 G/DL (ref 13.3–17.7)
MCH RBC QN AUTO: 31.2 PG (ref 26.5–33)
MCHC RBC AUTO-ENTMCNC: 31.5 G/DL (ref 31.5–36.5)
MCV RBC AUTO: 99 FL (ref 78–100)
PLATELET # BLD AUTO: 281 10E3/UL (ref 150–450)
POTASSIUM SERPL-SCNC: 4.4 MMOL/L (ref 3.4–5.3)
RBC # BLD AUTO: 4.3 10E6/UL (ref 4.4–5.9)
SODIUM SERPL-SCNC: 140 MMOL/L (ref 136–145)
WBC # BLD AUTO: 7.7 10E3/UL (ref 4–11)

## 2023-08-22 PROCEDURE — 85027 COMPLETE CBC AUTOMATED: CPT | Mod: ORL | Performed by: NURSE PRACTITIONER

## 2023-08-22 PROCEDURE — 80048 BASIC METABOLIC PNL TOTAL CA: CPT | Mod: ORL | Performed by: NURSE PRACTITIONER

## 2023-08-22 PROCEDURE — G0103 PSA SCREENING: HCPCS | Mod: ORL | Performed by: NURSE PRACTITIONER

## 2023-08-22 PROCEDURE — 80061 LIPID PANEL: CPT | Mod: ORL | Performed by: NURSE PRACTITIONER

## 2023-08-23 LAB
CHOLEST SERPL-MCNC: 166 MG/DL
HDLC SERPL-MCNC: 41 MG/DL
LDLC SERPL CALC-MCNC: 110 MG/DL
NONHDLC SERPL-MCNC: 125 MG/DL
PSA SERPL DL<=0.01 NG/ML-MCNC: 0.69 NG/ML (ref 0–6.5)
TRIGL SERPL-MCNC: 74 MG/DL

## 2023-08-23 RX ORDER — ZINC SULFATE 50(220)MG
220 CAPSULE ORAL DAILY
Status: ON HOLD | COMMUNITY
End: 2023-09-05

## 2023-08-23 NOTE — PROGRESS NOTES
Planning to discharge home on POD 1 in the morning with his son staying with him and helping him after surgery.       08/23/23 1322   Discharge Planning   Patient/Family Anticipates Transition to home with family  (outpatient PT arranged at [facility])   Concerns to be Addressed all concerns addressed in this encounter   Living Arrangements   People in Home alone   Type of Residence Private Residence   Is your private residence a single family home or apartment? Single family home   Number of Stairs, Within Home, Primary   (2 exterior steps and a ramp to get into the house)   Stair Railings, Within Home, Primary none   Once home, are you able to live on one level? Yes   Which level? Main Level   Bathroom Shower/Tub Tub/Shower unit   Equipment Currently Used at Home grab bar, tub/shower;shower chair;raised toilet seat  (Has a cane and walker at home)   Support System   Support Systems Children  (son, Baldomero, will stay with him after surgery)   Do you have someone available to stay with you one or two nights once you are home? Yes   Education   Patient attended total joint pre-op class/received pre-op teaching  email/phone call

## 2023-08-29 RX ORDER — FAMOTIDINE 40 MG/1
40 TABLET, FILM COATED ORAL AT BEDTIME
COMMUNITY

## 2023-08-29 RX ORDER — CODEINE PHOSPHATE AND GUAIFENESIN 10; 100 MG/5ML; MG/5ML
5-10 SOLUTION ORAL EVERY 4 HOURS PRN
Status: ON HOLD | COMMUNITY
Start: 2022-09-09 | End: 2023-09-05

## 2023-08-29 RX ORDER — HYDROCHLOROTHIAZIDE 25 MG/1
25 TABLET ORAL DAILY PRN
COMMUNITY
Start: 2023-04-26

## 2023-09-04 ENCOUNTER — ANESTHESIA EVENT (OUTPATIENT)
Dept: SURGERY | Facility: CLINIC | Age: 68
End: 2023-09-04
Payer: COMMERCIAL

## 2023-09-05 ENCOUNTER — ANESTHESIA (OUTPATIENT)
Dept: SURGERY | Facility: CLINIC | Age: 68
End: 2023-09-05
Payer: COMMERCIAL

## 2023-09-05 ENCOUNTER — APPOINTMENT (OUTPATIENT)
Dept: PHYSICAL THERAPY | Facility: CLINIC | Age: 68
End: 2023-09-05
Attending: ORTHOPAEDIC SURGERY
Payer: COMMERCIAL

## 2023-09-05 ENCOUNTER — HOSPITAL ENCOUNTER (OUTPATIENT)
Facility: CLINIC | Age: 68
Discharge: HOME | End: 2023-09-06
Attending: ORTHOPAEDIC SURGERY | Admitting: ORTHOPAEDIC SURGERY
Payer: COMMERCIAL

## 2023-09-05 DIAGNOSIS — M17.11 PRIMARY OSTEOARTHRITIS OF RIGHT KNEE: Primary | ICD-10-CM

## 2023-09-05 PROBLEM — K21.9 GASTROESOPHAGEAL REFLUX DISEASE WITHOUT ESOPHAGITIS: Status: ACTIVE | Noted: 2023-09-05

## 2023-09-05 PROBLEM — I10 PRIMARY HYPERTENSION: Status: ACTIVE | Noted: 2023-09-05

## 2023-09-05 PROBLEM — Z98.890 STATUS POST KNEE SURGERY: Status: ACTIVE | Noted: 2023-09-05

## 2023-09-05 PROCEDURE — 97161 PT EVAL LOW COMPLEX 20 MIN: CPT | Mod: GP

## 2023-09-05 PROCEDURE — 370N000017 HC ANESTHESIA TECHNICAL FEE, PER MIN: Performed by: ORTHOPAEDIC SURGERY

## 2023-09-05 PROCEDURE — 710N000010 HC RECOVERY PHASE 1, LEVEL 2, PER MIN: Performed by: ORTHOPAEDIC SURGERY

## 2023-09-05 PROCEDURE — C1713 ANCHOR/SCREW BN/BN,TIS/BN: HCPCS | Performed by: ORTHOPAEDIC SURGERY

## 2023-09-05 PROCEDURE — 250N000013 HC RX MED GY IP 250 OP 250 PS 637: Performed by: ANESTHESIOLOGY

## 2023-09-05 PROCEDURE — 258N000003 HC RX IP 258 OP 636: Performed by: NURSE ANESTHETIST, CERTIFIED REGISTERED

## 2023-09-05 PROCEDURE — 250N000009 HC RX 250: Performed by: PHYSICIAN ASSISTANT

## 2023-09-05 PROCEDURE — 250N000011 HC RX IP 250 OP 636: Mod: JZ | Performed by: NURSE ANESTHETIST, CERTIFIED REGISTERED

## 2023-09-05 PROCEDURE — 258N000003 HC RX IP 258 OP 636: Performed by: ORTHOPAEDIC SURGERY

## 2023-09-05 PROCEDURE — C1776 JOINT DEVICE (IMPLANTABLE): HCPCS | Performed by: ORTHOPAEDIC SURGERY

## 2023-09-05 PROCEDURE — 360N000077 HC SURGERY LEVEL 4, PER MIN: Performed by: ORTHOPAEDIC SURGERY

## 2023-09-05 PROCEDURE — 99417 PROLNG OP E/M EACH 15 MIN: CPT | Performed by: HOSPITALIST

## 2023-09-05 PROCEDURE — 250N000011 HC RX IP 250 OP 636: Mod: JZ | Performed by: ORTHOPAEDIC SURGERY

## 2023-09-05 PROCEDURE — 250N000009 HC RX 250: Performed by: NURSE ANESTHETIST, CERTIFIED REGISTERED

## 2023-09-05 PROCEDURE — 97110 THERAPEUTIC EXERCISES: CPT | Mod: GP

## 2023-09-05 PROCEDURE — 250N000011 HC RX IP 250 OP 636: Performed by: ANESTHESIOLOGY

## 2023-09-05 PROCEDURE — 250N000011 HC RX IP 250 OP 636: Performed by: PHYSICIAN ASSISTANT

## 2023-09-05 PROCEDURE — 99205 OFFICE O/P NEW HI 60 MIN: CPT | Performed by: HOSPITALIST

## 2023-09-05 PROCEDURE — 250N000013 HC RX MED GY IP 250 OP 250 PS 637: Performed by: ORTHOPAEDIC SURGERY

## 2023-09-05 PROCEDURE — 250N000013 HC RX MED GY IP 250 OP 250 PS 637: Performed by: PHYSICIAN ASSISTANT

## 2023-09-05 PROCEDURE — 999N000141 HC STATISTIC PRE-PROCEDURE NURSING ASSESSMENT: Performed by: ORTHOPAEDIC SURGERY

## 2023-09-05 PROCEDURE — 272N000001 HC OR GENERAL SUPPLY STERILE: Performed by: ORTHOPAEDIC SURGERY

## 2023-09-05 PROCEDURE — 258N000003 HC RX IP 258 OP 636: Performed by: ANESTHESIOLOGY

## 2023-09-05 PROCEDURE — 250N000013 HC RX MED GY IP 250 OP 250 PS 637: Performed by: HOSPITALIST

## 2023-09-05 DEVICE — BONE CEMENT RADIOPAQUE SIMPLEX HV FULL DOSE 6194-1-001: Type: IMPLANTABLE DEVICE | Site: KNEE | Status: FUNCTIONAL

## 2023-09-05 DEVICE — GENESIS II RESURFACING PATELLAR 35MM
Type: IMPLANTABLE DEVICE | Site: KNEE | Status: FUNCTIONAL
Brand: GENESIS II

## 2023-09-05 DEVICE — GENESIS II NON-POROUS TIBIAL                                    BASEPLATE SIZE 5 RIGHT
Type: IMPLANTABLE DEVICE | Site: KNEE | Status: FUNCTIONAL
Brand: GENESIS II

## 2023-09-05 DEVICE — LEGION CRUCIATE RETAINING                                    NONPOROUS FEMORAL SIZE 6 RIGHT
Type: IMPLANTABLE DEVICE | Site: KNEE | Status: FUNCTIONAL
Brand: LEGION

## 2023-09-05 DEVICE — LEGION CR HIGH FLEX XLPE SZ 5-6 10MM
Type: IMPLANTABLE DEVICE | Site: KNEE | Status: FUNCTIONAL
Brand: LEGION

## 2023-09-05 RX ORDER — TAMSULOSIN HYDROCHLORIDE 0.4 MG/1
0.4 CAPSULE ORAL DAILY
Status: DISCONTINUED | OUTPATIENT
Start: 2023-09-05 | End: 2023-09-06 | Stop reason: HOSPADM

## 2023-09-05 RX ORDER — ONDANSETRON 4 MG/1
4 TABLET, ORALLY DISINTEGRATING ORAL EVERY 30 MIN PRN
Status: DISCONTINUED | OUTPATIENT
Start: 2023-09-05 | End: 2023-09-05 | Stop reason: HOSPADM

## 2023-09-05 RX ORDER — ONDANSETRON 2 MG/ML
INJECTION INTRAMUSCULAR; INTRAVENOUS PRN
Status: DISCONTINUED | OUTPATIENT
Start: 2023-09-05 | End: 2023-09-05

## 2023-09-05 RX ORDER — BISACODYL 10 MG
10 SUPPOSITORY, RECTAL RECTAL DAILY PRN
Status: DISCONTINUED | OUTPATIENT
Start: 2023-09-05 | End: 2023-09-06 | Stop reason: HOSPADM

## 2023-09-05 RX ORDER — MAGNESIUM SULFATE 4 G/50ML
4 INJECTION INTRAVENOUS ONCE
Status: DISCONTINUED | OUTPATIENT
Start: 2023-09-05 | End: 2023-09-05 | Stop reason: HOSPADM

## 2023-09-05 RX ORDER — HYDROMORPHONE HCL IN WATER/PF 6 MG/30 ML
0.2 PATIENT CONTROLLED ANALGESIA SYRINGE INTRAVENOUS
Status: DISCONTINUED | OUTPATIENT
Start: 2023-09-05 | End: 2023-09-06 | Stop reason: HOSPADM

## 2023-09-05 RX ORDER — FAMOTIDINE 20 MG/1
40 TABLET, FILM COATED ORAL AT BEDTIME
Status: DISCONTINUED | OUTPATIENT
Start: 2023-09-05 | End: 2023-09-06 | Stop reason: HOSPADM

## 2023-09-05 RX ORDER — HYDROMORPHONE HCL IN WATER/PF 6 MG/30 ML
0.4 PATIENT CONTROLLED ANALGESIA SYRINGE INTRAVENOUS EVERY 5 MIN PRN
Status: DISCONTINUED | OUTPATIENT
Start: 2023-09-05 | End: 2023-09-05 | Stop reason: HOSPADM

## 2023-09-05 RX ORDER — OXYCODONE HYDROCHLORIDE 5 MG/1
10 TABLET ORAL EVERY 4 HOURS PRN
Status: DISCONTINUED | OUTPATIENT
Start: 2023-09-05 | End: 2023-09-06 | Stop reason: HOSPADM

## 2023-09-05 RX ORDER — POLYETHYLENE GLYCOL 3350 17 G/17G
17 POWDER, FOR SOLUTION ORAL DAILY
Status: DISCONTINUED | OUTPATIENT
Start: 2023-09-06 | End: 2023-09-06 | Stop reason: HOSPADM

## 2023-09-05 RX ORDER — NALOXONE HYDROCHLORIDE 0.4 MG/ML
0.2 INJECTION, SOLUTION INTRAMUSCULAR; INTRAVENOUS; SUBCUTANEOUS
Status: DISCONTINUED | OUTPATIENT
Start: 2023-09-05 | End: 2023-09-06 | Stop reason: HOSPADM

## 2023-09-05 RX ORDER — AMOXICILLIN 250 MG
1 CAPSULE ORAL 2 TIMES DAILY
Status: DISCONTINUED | OUTPATIENT
Start: 2023-09-05 | End: 2023-09-06 | Stop reason: HOSPADM

## 2023-09-05 RX ORDER — PROPRANOLOL HYDROCHLORIDE 20 MG/1
20 TABLET ORAL 2 TIMES DAILY
Status: DISCONTINUED | OUTPATIENT
Start: 2023-09-05 | End: 2023-09-06 | Stop reason: HOSPADM

## 2023-09-05 RX ORDER — PROCHLORPERAZINE MALEATE 5 MG
5 TABLET ORAL EVERY 6 HOURS PRN
Status: DISCONTINUED | OUTPATIENT
Start: 2023-09-05 | End: 2023-09-06 | Stop reason: HOSPADM

## 2023-09-05 RX ORDER — OXYCODONE HYDROCHLORIDE 5 MG/1
5 TABLET ORAL EVERY 4 HOURS PRN
Status: DISCONTINUED | OUTPATIENT
Start: 2023-09-05 | End: 2023-09-06 | Stop reason: HOSPADM

## 2023-09-05 RX ORDER — ACETAMINOPHEN 325 MG/1
650 TABLET ORAL EVERY 4 HOURS PRN
Status: DISCONTINUED | OUTPATIENT
Start: 2023-09-08 | End: 2023-09-06 | Stop reason: HOSPADM

## 2023-09-05 RX ORDER — ONDANSETRON 2 MG/ML
4 INJECTION INTRAMUSCULAR; INTRAVENOUS EVERY 6 HOURS PRN
Status: DISCONTINUED | OUTPATIENT
Start: 2023-09-05 | End: 2023-09-06 | Stop reason: HOSPADM

## 2023-09-05 RX ORDER — CELECOXIB 100 MG/1
100 CAPSULE ORAL 2 TIMES DAILY
Status: DISCONTINUED | OUTPATIENT
Start: 2023-09-05 | End: 2023-09-06 | Stop reason: HOSPADM

## 2023-09-05 RX ORDER — PROPRANOLOL HYDROCHLORIDE 40 MG/1
20 TABLET ORAL 2 TIMES DAILY
COMMUNITY
End: 2024-02-01 | Stop reason: DRUGHIGH

## 2023-09-05 RX ORDER — DEXAMETHASONE SODIUM PHOSPHATE 10 MG/ML
INJECTION, SOLUTION INTRAMUSCULAR; INTRAVENOUS PRN
Status: DISCONTINUED | OUTPATIENT
Start: 2023-09-05 | End: 2023-09-05

## 2023-09-05 RX ORDER — HYDROXYZINE HYDROCHLORIDE 10 MG/1
10 TABLET, FILM COATED ORAL EVERY 6 HOURS PRN
Status: DISCONTINUED | OUTPATIENT
Start: 2023-09-05 | End: 2023-09-06 | Stop reason: HOSPADM

## 2023-09-05 RX ORDER — PROPOFOL 10 MG/ML
INJECTION, EMULSION INTRAVENOUS CONTINUOUS PRN
Status: DISCONTINUED | OUTPATIENT
Start: 2023-09-05 | End: 2023-09-05

## 2023-09-05 RX ORDER — NALOXONE HYDROCHLORIDE 0.4 MG/ML
0.4 INJECTION, SOLUTION INTRAMUSCULAR; INTRAVENOUS; SUBCUTANEOUS
Status: DISCONTINUED | OUTPATIENT
Start: 2023-09-05 | End: 2023-09-06 | Stop reason: HOSPADM

## 2023-09-05 RX ORDER — PROPRANOLOL HYDROCHLORIDE 20 MG/1
20 TABLET ORAL ONCE
Status: COMPLETED | OUTPATIENT
Start: 2023-09-05 | End: 2023-09-05

## 2023-09-05 RX ORDER — ONDANSETRON 2 MG/ML
4 INJECTION INTRAMUSCULAR; INTRAVENOUS EVERY 30 MIN PRN
Status: DISCONTINUED | OUTPATIENT
Start: 2023-09-05 | End: 2023-09-05 | Stop reason: HOSPADM

## 2023-09-05 RX ORDER — CEFAZOLIN SODIUM/WATER 2 G/20 ML
2 SYRINGE (ML) INTRAVENOUS SEE ADMIN INSTRUCTIONS
Status: DISCONTINUED | OUTPATIENT
Start: 2023-09-05 | End: 2023-09-05 | Stop reason: HOSPADM

## 2023-09-05 RX ORDER — HALOPERIDOL 5 MG/ML
1 INJECTION INTRAMUSCULAR
Status: DISCONTINUED | OUTPATIENT
Start: 2023-09-05 | End: 2023-09-05 | Stop reason: HOSPADM

## 2023-09-05 RX ORDER — BUPIVACAINE HYDROCHLORIDE 5 MG/ML
INJECTION, SOLUTION EPIDURAL; INTRACAUDAL
Status: COMPLETED | OUTPATIENT
Start: 2023-09-05 | End: 2023-09-05

## 2023-09-05 RX ORDER — DIPHENHYDRAMINE HCL 12.5 MG/5ML
12.5 SOLUTION ORAL EVERY 6 HOURS PRN
Status: DISCONTINUED | OUTPATIENT
Start: 2023-09-05 | End: 2023-09-06 | Stop reason: HOSPADM

## 2023-09-05 RX ORDER — SENNOSIDES 8.6 MG
2 CAPSULE ORAL EVERY MORNING
COMMUNITY
End: 2024-03-26

## 2023-09-05 RX ORDER — HYDROMORPHONE HCL IN WATER/PF 6 MG/30 ML
0.4 PATIENT CONTROLLED ANALGESIA SYRINGE INTRAVENOUS
Status: DISCONTINUED | OUTPATIENT
Start: 2023-09-05 | End: 2023-09-06 | Stop reason: HOSPADM

## 2023-09-05 RX ORDER — CALCIUM CARBONATE 500 MG/1
500 TABLET, CHEWABLE ORAL 4 TIMES DAILY PRN
Status: DISCONTINUED | OUTPATIENT
Start: 2023-09-05 | End: 2023-09-06 | Stop reason: HOSPADM

## 2023-09-05 RX ORDER — ENOXAPARIN SODIUM 100 MG/ML
40 INJECTION SUBCUTANEOUS EVERY 24 HOURS
Status: COMPLETED | OUTPATIENT
Start: 2023-09-06 | End: 2023-09-06

## 2023-09-05 RX ORDER — ACETAMINOPHEN 325 MG/1
975 TABLET ORAL EVERY 8 HOURS
Status: DISCONTINUED | OUTPATIENT
Start: 2023-09-05 | End: 2023-09-06 | Stop reason: HOSPADM

## 2023-09-05 RX ORDER — PROPOFOL 10 MG/ML
INJECTION, EMULSION INTRAVENOUS PRN
Status: DISCONTINUED | OUTPATIENT
Start: 2023-09-05 | End: 2023-09-05

## 2023-09-05 RX ORDER — LIDOCAINE 40 MG/G
CREAM TOPICAL
Status: DISCONTINUED | OUTPATIENT
Start: 2023-09-05 | End: 2023-09-06 | Stop reason: HOSPADM

## 2023-09-05 RX ORDER — SODIUM CHLORIDE, SODIUM LACTATE, POTASSIUM CHLORIDE, CALCIUM CHLORIDE 600; 310; 30; 20 MG/100ML; MG/100ML; MG/100ML; MG/100ML
INJECTION, SOLUTION INTRAVENOUS CONTINUOUS
Status: DISCONTINUED | OUTPATIENT
Start: 2023-09-05 | End: 2023-09-05 | Stop reason: HOSPADM

## 2023-09-05 RX ORDER — FENTANYL CITRATE 50 UG/ML
25 INJECTION, SOLUTION INTRAMUSCULAR; INTRAVENOUS EVERY 5 MIN PRN
Status: DISCONTINUED | OUTPATIENT
Start: 2023-09-05 | End: 2023-09-05 | Stop reason: HOSPADM

## 2023-09-05 RX ORDER — SODIUM CHLORIDE, SODIUM LACTATE, POTASSIUM CHLORIDE, CALCIUM CHLORIDE 600; 310; 30; 20 MG/100ML; MG/100ML; MG/100ML; MG/100ML
INJECTION, SOLUTION INTRAVENOUS CONTINUOUS
Status: DISCONTINUED | OUTPATIENT
Start: 2023-09-05 | End: 2023-09-06 | Stop reason: HOSPADM

## 2023-09-05 RX ORDER — TRANEXAMIC ACID 650 MG/1
1950 TABLET ORAL ONCE
Status: COMPLETED | OUTPATIENT
Start: 2023-09-05 | End: 2023-09-05

## 2023-09-05 RX ORDER — FENTANYL CITRATE 50 UG/ML
25-100 INJECTION, SOLUTION INTRAMUSCULAR; INTRAVENOUS
Status: DISCONTINUED | OUTPATIENT
Start: 2023-09-05 | End: 2023-09-05 | Stop reason: HOSPADM

## 2023-09-05 RX ORDER — LIDOCAINE 40 MG/G
CREAM TOPICAL
Status: DISCONTINUED | OUTPATIENT
Start: 2023-09-05 | End: 2023-09-05 | Stop reason: HOSPADM

## 2023-09-05 RX ORDER — MEPERIDINE HYDROCHLORIDE 25 MG/ML
12.5 INJECTION INTRAMUSCULAR; INTRAVENOUS; SUBCUTANEOUS EVERY 5 MIN PRN
Status: DISCONTINUED | OUTPATIENT
Start: 2023-09-05 | End: 2023-09-05 | Stop reason: HOSPADM

## 2023-09-05 RX ORDER — ASPIRIN 81 MG/1
81 TABLET ORAL 2 TIMES DAILY
Status: DISCONTINUED | OUTPATIENT
Start: 2023-09-05 | End: 2023-09-05

## 2023-09-05 RX ORDER — FENTANYL CITRATE 50 UG/ML
50 INJECTION, SOLUTION INTRAMUSCULAR; INTRAVENOUS EVERY 5 MIN PRN
Status: DISCONTINUED | OUTPATIENT
Start: 2023-09-05 | End: 2023-09-05 | Stop reason: HOSPADM

## 2023-09-05 RX ORDER — ONDANSETRON 4 MG/1
4 TABLET, ORALLY DISINTEGRATING ORAL EVERY 6 HOURS PRN
Status: DISCONTINUED | OUTPATIENT
Start: 2023-09-05 | End: 2023-09-06 | Stop reason: HOSPADM

## 2023-09-05 RX ORDER — LIDOCAINE HYDROCHLORIDE 10 MG/ML
INJECTION, SOLUTION INFILTRATION; PERINEURAL PRN
Status: DISCONTINUED | OUTPATIENT
Start: 2023-09-05 | End: 2023-09-05

## 2023-09-05 RX ORDER — HYDROMORPHONE HCL IN WATER/PF 6 MG/30 ML
0.2 PATIENT CONTROLLED ANALGESIA SYRINGE INTRAVENOUS EVERY 5 MIN PRN
Status: DISCONTINUED | OUTPATIENT
Start: 2023-09-05 | End: 2023-09-05 | Stop reason: HOSPADM

## 2023-09-05 RX ORDER — CEFAZOLIN SODIUM/WATER 2 G/20 ML
2 SYRINGE (ML) INTRAVENOUS
Status: COMPLETED | OUTPATIENT
Start: 2023-09-05 | End: 2023-09-05

## 2023-09-05 RX ORDER — ZINC GLUCONATE 50 MG
50 TABLET ORAL DAILY
COMMUNITY

## 2023-09-05 RX ORDER — GLYCOPYRROLATE 0.2 MG/ML
INJECTION, SOLUTION INTRAMUSCULAR; INTRAVENOUS PRN
Status: DISCONTINUED | OUTPATIENT
Start: 2023-09-05 | End: 2023-09-05

## 2023-09-05 RX ORDER — METHOCARBAMOL 500 MG/1
500 TABLET, FILM COATED ORAL EVERY 6 HOURS PRN
Status: DISCONTINUED | OUTPATIENT
Start: 2023-09-05 | End: 2023-09-06 | Stop reason: HOSPADM

## 2023-09-05 RX ORDER — CEFAZOLIN SODIUM 2 G/100ML
2 INJECTION, SOLUTION INTRAVENOUS EVERY 8 HOURS
Status: COMPLETED | OUTPATIENT
Start: 2023-09-05 | End: 2023-09-05

## 2023-09-05 RX ORDER — BUPIVACAINE HYDROCHLORIDE 7.5 MG/ML
INJECTION, SOLUTION INTRASPINAL
Status: COMPLETED | OUTPATIENT
Start: 2023-09-05 | End: 2023-09-05

## 2023-09-05 RX ADMIN — MIDAZOLAM 1 MG: 1 INJECTION INTRAMUSCULAR; INTRAVENOUS at 06:44

## 2023-09-05 RX ADMIN — OXYCODONE HYDROCHLORIDE 10 MG: 5 TABLET ORAL at 12:30

## 2023-09-05 RX ADMIN — ONDANSETRON 4 MG: 2 INJECTION INTRAMUSCULAR; INTRAVENOUS at 07:25

## 2023-09-05 RX ADMIN — GLYCOPYRROLATE 0.2 MG: 0.2 INJECTION INTRAMUSCULAR; INTRAVENOUS at 07:32

## 2023-09-05 RX ADMIN — TRANEXAMIC ACID 1950 MG: 650 TABLET ORAL at 05:52

## 2023-09-05 RX ADMIN — PHENYLEPHRINE HYDROCHLORIDE 50 MCG: 10 INJECTION INTRAVENOUS at 08:34

## 2023-09-05 RX ADMIN — OXYCODONE HYDROCHLORIDE 10 MG: 5 TABLET ORAL at 20:39

## 2023-09-05 RX ADMIN — Medication 2 G: at 07:09

## 2023-09-05 RX ADMIN — PROPOFOL 100 MCG/KG/MIN: 10 INJECTION, EMULSION INTRAVENOUS at 07:25

## 2023-09-05 RX ADMIN — LIDOCAINE HYDROCHLORIDE 2 ML: 10 INJECTION, SOLUTION INFILTRATION; PERINEURAL at 07:25

## 2023-09-05 RX ADMIN — ACETAMINOPHEN 975 MG: 325 TABLET ORAL at 12:30

## 2023-09-05 RX ADMIN — BENZOCAINE AND MENTHOL 1 LOZENGE: 15; 3.6 LOZENGE ORAL at 14:27

## 2023-09-05 RX ADMIN — CEFAZOLIN SODIUM 2 G: 2 INJECTION, SOLUTION INTRAVENOUS at 23:28

## 2023-09-05 RX ADMIN — PHENYLEPHRINE HYDROCHLORIDE 0.3 MCG/KG/MIN: 10 INJECTION INTRAVENOUS at 08:03

## 2023-09-05 RX ADMIN — FENTANYL CITRATE 50 MCG: 50 INJECTION, SOLUTION INTRAMUSCULAR; INTRAVENOUS at 06:44

## 2023-09-05 RX ADMIN — PROPRANOLOL HYDROCHLORIDE 20 MG: 20 TABLET ORAL at 06:36

## 2023-09-05 RX ADMIN — SODIUM CHLORIDE, POTASSIUM CHLORIDE, SODIUM LACTATE AND CALCIUM CHLORIDE: 600; 310; 30; 20 INJECTION, SOLUTION INTRAVENOUS at 07:59

## 2023-09-05 RX ADMIN — SODIUM CHLORIDE, POTASSIUM CHLORIDE, SODIUM LACTATE AND CALCIUM CHLORIDE: 600; 310; 30; 20 INJECTION, SOLUTION INTRAVENOUS at 13:47

## 2023-09-05 RX ADMIN — ACETAMINOPHEN 975 MG: 325 TABLET ORAL at 20:38

## 2023-09-05 RX ADMIN — SENNOSIDES AND DOCUSATE SODIUM 1 TABLET: 50; 8.6 TABLET ORAL at 20:39

## 2023-09-05 RX ADMIN — BENZOCAINE AND MENTHOL 1 LOZENGE: 15; 3.6 LOZENGE ORAL at 17:01

## 2023-09-05 RX ADMIN — BUPIVACAINE HYDROCHLORIDE 15 ML: 5 INJECTION, SOLUTION EPIDURAL; INTRACAUDAL; PERINEURAL at 06:46

## 2023-09-05 RX ADMIN — PHENYLEPHRINE HYDROCHLORIDE 50 MCG: 10 INJECTION INTRAVENOUS at 08:31

## 2023-09-05 RX ADMIN — TAMSULOSIN HYDROCHLORIDE 0.4 MG: 0.4 CAPSULE ORAL at 13:46

## 2023-09-05 RX ADMIN — BENZOCAINE AND MENTHOL 1 LOZENGE: 15; 3.6 LOZENGE ORAL at 21:07

## 2023-09-05 RX ADMIN — PHENYLEPHRINE HYDROCHLORIDE 100 MCG: 10 INJECTION INTRAVENOUS at 08:03

## 2023-09-05 RX ADMIN — FENTANYL CITRATE 50 MCG: 50 INJECTION, SOLUTION INTRAMUSCULAR; INTRAVENOUS at 06:46

## 2023-09-05 RX ADMIN — BUPIVACAINE HYDROCHLORIDE IN DEXTROSE 1.8 ML: 7.5 INJECTION, SOLUTION SUBARACHNOID at 07:19

## 2023-09-05 RX ADMIN — CEFAZOLIN SODIUM 2 G: 2 INJECTION, SOLUTION INTRAVENOUS at 14:27

## 2023-09-05 RX ADMIN — FAMOTIDINE 40 MG: 20 TABLET, FILM COATED ORAL at 21:06

## 2023-09-05 RX ADMIN — DEXAMETHASONE SODIUM PHOSPHATE 10 MG: 10 INJECTION, SOLUTION INTRAMUSCULAR; INTRAVENOUS at 07:25

## 2023-09-05 RX ADMIN — PROPOFOL 50 MG: 10 INJECTION, EMULSION INTRAVENOUS at 07:25

## 2023-09-05 RX ADMIN — OXYCODONE HYDROCHLORIDE 5 MG: 5 TABLET ORAL at 16:50

## 2023-09-05 RX ADMIN — SODIUM CHLORIDE, POTASSIUM CHLORIDE, SODIUM LACTATE AND CALCIUM CHLORIDE: 600; 310; 30; 20 INJECTION, SOLUTION INTRAVENOUS at 06:16

## 2023-09-05 RX ADMIN — CELECOXIB 100 MG: 100 CAPSULE ORAL at 20:38

## 2023-09-05 ASSESSMENT — ACTIVITIES OF DAILY LIVING (ADL)
ADLS_ACUITY_SCORE: 20

## 2023-09-05 ASSESSMENT — ENCOUNTER SYMPTOMS: DYSRHYTHMIAS: 1

## 2023-09-05 NOTE — PROGRESS NOTES
09/05/23 1520   Appointment Info   Signing Clinician's Name / Credentials (PT) Muna Patel PT   Quick Adds   Quick Adds Certification   Living Environment   People in Home alone   Current Living Arrangements house   Home Accessibility no concerns;other (see comments)  (ramped entrance)   Transportation Anticipated family or friend will provide   Living Environment Comments son will be staying with patient for a few days; can live on one level   Self-Care   Equipment Currently Used at Home cane, straight;walker, rolling;other (see comments)  (FWW and 4WW)   Activity/Exercise/Self-Care Comment independent ADL's/IADL's   General Information   Onset of Illness/Injury or Date of Surgery 09/05/23   Referring Physician Dr. Haney   Patient/Family Therapy Goals Statement (PT) less pain; move better   Pertinent History of Current Problem (include personal factors and/or comorbidities that impact the POC) R TKA 9/5/23; PMH of HTN, afib, HLD   Existing Precautions/Restrictions no known precautions/restrictions   Weight-Bearing Status - RLE weight-bearing as tolerated   Cognition   Affect/Mental Status (Cognition) WFL   Orientation Status (Cognition) oriented x 4   Follows Commands (Cognition) WFL   Range of Motion (ROM)   ROM Comment R knee AROM 8-92   Strength (Manual Muscle Testing)   Strength Comments decreased R knee strength s/p TKA   Transfers   Transfers sit-stand transfer   Sit-Stand Transfer   Sit-Stand [place] (Transfers) supervision;verbal cues   Assistive Device (Sit-Stand Transfers) walker, front-wheeled   Gait/Stairs (Locomotion)   [place] Level (Gait) supervision;verbal cues;nonverbal cues (demo/gesture);contact guard   Assistive Device (Gait) walker, front-wheeled   Distance in Feet 150   Pattern (Gait) step-through   Deviations/Abnormal Patterns (Gait) antalgic;gait speed decreased;weight shifting decreased   Clinical Impression   Criteria for Skilled Therapeutic Intervention Yes, treatment  indicated   PT Diagnosis (PT) impaired functional mobility   Influenced by the following impairments decreased rom, strength   Functional limitations due to impairments gait, transfers, stairs   Clinical Presentation (PT Evaluation Complexity) Stable/Uncomplicated   Clinical Presentation Rationale pt presents as medically diagnosed   Clinical Decision Making (Complexity) low complexity   Planned Therapy Interventions (PT) gait training;home exercise program;patient/family education;transfer training;strengthening;stair training;ROM (range of motion);stretching   Anticipated Equipment Needs at Discharge (PT) walker, rolling   Risk & Benefits of therapy have been explained evaluation/treatment results reviewed;patient;son   PT Total Evaluation Time   PT Eval, Low Complexity Minutes (15316) 15   Plan of Care Review   Plan of Care Reviewed With patient;angela   Therapy Certification   Start of care date 09/05/23   Certification date from 09/05/23   Certification date to 09/12/23   Medical Diagnosis OA R knee   Physical Therapy Goals   PT Frequency 2x/day   PT Predicted Duration/Target Date for Goal Attainment 09/07/23   PT Goals Transfers;Gait   PT: Transfers Modified independent;Sit to/from stand;Assistive device   PT: Gait Modified independent;Assistive device;Greater than 200 feet   Interventions   Interventions Quick Adds Therapeutic Procedure   Therapeutic Procedure/Exercise   Ther. Procedure: strength, endurance, ROM, flexibillity Minutes (98709) 15   Symptoms Noted During/After Treatment fatigue;increased pain   Treatment Detail/Skilled Intervention instructed pt in R TKA HEP and pt performed recliner and seated RLE ex x5 reps with sba post cuing and demonstration for technique and breathing pattern during exercise   PT Discharge Planning   PT Plan review HEP, gait with rw; trial stairs   PT Discharge Recommendation (DC Rec)   (defer to ortho)   PT Rationale for DC Rec pt moving well with walker   PT Brief overview  of current status amb. 150 feet with walker cga   Total Session Time   Timed Code Treatment Minutes 15   Total Session Time (sum of timed and untimed services) 30   M [facility]  OUTPATIENT PHYSICAL THERAPY EVALUATION  PLAN OF TREATMENT FOR OUTPATIENT REHABILITATION  (COMPLETE FOR INITIAL CLAIMS ONLY)  Patient's Last Name, First Name, M.I.  YOB: 1955  Josue Crook                        Provider's Name  [facility] Medical Record No.  [id number]                             Onset Date:  09/05/23   Start of Care Date:  09/05/23   Type:     _X_PT   ___OT   ___SLP Medical Diagnosis:  OA R knee              PT Diagnosis:  impaired functional mobility Visits from SOC:  1     See note for plan of treatment, functional goals and certification details    I CERTIFY THE NEED FOR THESE SERVICES FURNISHED UNDER        THIS PLAN OF TREATMENT AND WHILE UNDER MY CARE     (Physician co-signature of this document indicates review and certification of the therapy plan).

## 2023-09-05 NOTE — ANESTHESIA CARE TRANSFER NOTE
Patient: Josue Crook    Procedure: Procedure(s):  RIGHT TOTAL KNEE ARTHROPLASTY       Diagnosis: Osteoarthritis of right knee [M17.11]  Diagnosis Additional Information: No value filed.    Anesthesia Type:   Spinal     Note:    Oropharynx: oropharynx clear of all foreign objects  Level of Consciousness: drowsy  Oxygen Supplementation: face mask  Level of Supplemental Oxygen (L/min / FiO2): 5  Independent Airway: airway patency satisfactory and stable  Dentition: dentition unchanged  Vital Signs Stable: post-procedure vital signs reviewed and stable  Report to RN Given: handoff report given  Patient transferred to: PACU    Handoff Report: Identifed the Patient, Identified the Reponsible Provider, Reviewed the pertinent medical history, Discussed the surgical course, Reviewed Intra-OP anesthesia mangement and issues during anesthesia, Set expectations for post-procedure period and Allowed opportunity for questions and acknowledgement of understanding      Vitals:  Vitals Value Taken Time   /58 09/05/23 0858   Temp 36.9  C (98.5  F) 09/05/23 0858   Pulse 78 09/05/23 0859   Resp 17 09/05/23 0859   SpO2 98 % 09/05/23 0859   Vitals shown include unvalidated device data.    Electronically Signed By: THERESA KHAN CRNA  September 5, 2023  9:00 AM

## 2023-09-05 NOTE — PHARMACY-ADMISSION MEDICATION HISTORY
Pharmacist Admission Medication History    Admission medication history is complete. The information provided in this note is only as accurate as the sources available at the time of the update.    Medication reconciliation/reorder completed by provider prior to medication history? No    Information Source(s): Patient, Facility (U/NH/) medication list/MAR, and CareEverywhere/SureScripts via in-person    Pertinent Information: Propranolol just recently reduced    Changes made to PTA medication list:  Added: Tylenol CR  Deleted: None  Changed: Propranolol    Medication Affordability:  Not including over the counter (OTC) medications, was there a time in the past 3 months when you did not take your medications as prescribed because of cost?: No    Allergies reviewed with patient and updates made in EHR: yes    Medication History Completed By: Allen Peterson RPH 9/5/2023 6:33 AM    Prior to Admission medications    Medication Sig Last Dose Taking? Auth Provider Long Term End Date   acetaminophen (TYLENOL) 650 MG CR tablet Take 1,300 mg by mouth 3 times daily 9/4/2023 at am Yes Unknown, Entered By History     apixaban ANTICOAGULANT (ELIQUIS) 5 MG tablet Take 5 mg by mouth 2 times daily One in the morning and one in the night  Last dose 8/31/23 before surgery 8/31/2023 Yes Reported, Patient     cholecalciferol (VITAMIN D3) 25 mcg (1000 units) capsule Take 2 capsules by mouth daily 9/4/2023 Yes Reported, Patient     diclofenac (VOLTAREN) 1 % topical gel Apply 4 g topically 4 times daily 9/4/2023 at not with Yes Unknown, Entered By History     famotidine (PEPCID) 40 MG tablet Take 40 mg by mouth At Bedtime 9/4/2023 Yes Reported, Patient     hydrochlorothiazide (HYDRODIURIL) 25 MG tablet Take 25 mg by mouth daily as needed (for edema) Past Week Yes Reported, Patient Yes    lactobacillus rhamnosus, GG, (CULTURELL) capsule Take 1 capsule by mouth daily 9/4/2023 Yes Reported, Patient     Multiple Vitamins-Minerals (MULTIVITAL  PO) Take 1 tablet by mouth daily Stopping 8/29/23 before surgery 8/29/2023 Yes Reported, Patient     propranolol (INDERAL) 40 MG tablet Take 20 mg by mouth 2 times daily 9/5 in [facility] Yes Unknown, Entered By History No    tamsulosin (FLOMAX) 0.4 MG capsule Take 0.4 mg by mouth daily 9/4/2023 Yes Reported, Patient     zinc gluconate 50 MG tablet Take 50 mg by mouth daily 9/4/2023 Yes Unknown, Entered By History

## 2023-09-05 NOTE — ANESTHESIA POSTPROCEDURE EVALUATION
Patient: Josue Crook    Procedure: Procedure(s):  RIGHT TOTAL KNEE ARTHROPLASTY       Anesthesia Type:  Spinal    Note:  Disposition: Admission   Postop Pain Control: Uneventful            Sign Out: Well controlled pain   PONV: No   Neuro/Psych: Uneventful            Sign Out: Acceptable/Baseline neuro status   Airway/Respiratory: Uneventful            Sign Out: Acceptable/Baseline resp. status   CV/Hemodynamics: Uneventful            Sign Out: Acceptable CV status; No obvious hypovolemia; No obvious fluid overload   Other NRE: NONE   DID A NON-ROUTINE EVENT OCCUR? No           Last vitals:  Vitals Value Taken Time   /72 09/05/23 0931   Temp 37.4  C (99.3  F) 09/05/23 0920   Pulse 67 09/05/23 0931   Resp 22 09/05/23 0930   SpO2 98 % 09/05/23 0931   Vitals shown include unvalidated device data.    Electronically Signed By: MOISÉS PANDEY MD  September 5, 2023  9:35 AM

## 2023-09-05 NOTE — CONSULTS
[facility]  Consult Note - Hospitalist Service  Date of Admission:  9/5/2023  Consult Requested by: Orthopedics   Reason for Consult: Postop medical cares    Assessment & Plan   Josue Crook is a 68 year old old male with a history of HTN, GERD, OA underwent Right TKA. [facility] service was asked to evaluate patient for postoperative medical management as follows below. Please resume the home medications as reconciled and further noted below with ordered hold parameters.  Vital signs have been stable post-operatively including hemodynamically stable blood pressure and heart rate. Thank you for this consult; we will continue to follow this patient until discharge.    HTN  -Order home medications with the written tiered hold parameters given risk for post-operative hypotension.     GERD  -Order home antacids/medications. Utilize formulary while inpatient.    S/p Right TKA  Knee OA  Acute Post-Op Pain  -Agree with current pain control regimen; consider acute pain team consultation if increasingly difficult to control or proves refractory.   -PT evaluation.   -OT evaluation.  -IS frequently, initially every hour while awake as tolerated. Directly encouraged and discussed.      Post-Op DVT Prophylaxis  -As per primary surgery team.      Procedure(s):  RIGHT TOTAL KNEE ARTHROPLASTY  Post-operative Day: Day of Surgery  Code status:Full Code     Type of Anesthesia   Spinal    Estimated Blood Loss:  * No values recorded between 9/5/2023  7:43 AM and 9/5/2023  8:55 AM *    Hospital Problem List   No problem-specific Assessment & Plan notes found for this encounter.    Principal Problem:    Primary osteoarthritis of one knee, right  Active Problems:    Primary hypertension    Gastroesophageal reflux disease without esophagitis    Status post knee surgery      -Reviewed the patient's preoperative H and P and updated missing elements.  -Home medication reconciliation has been reviewed.  Medications have  "been ordered as noted from the home list and changes are documented above        Clinically Significant Risk Factors Present on Admission               # Drug Induced Coagulation Defect: home medication list includes an anticoagulant medication    # Hypertension: Noted on problem list      # Obesity: Estimated body mass index is 36.48 kg/m  as calculated from the following:    Height as of this encounter: 1.727 m (5' 8\").    Weight as of this encounter: 108.8 kg (239 lb 14.4 oz).              Bola Kent MD  Hospitalist Service  Securely message with Vocera (more info)  Text page via [facility] Paging/Directory   ______________________________________________________________________    Chief Complaint   Postop medical cares    History is obtained from the patient    History of Present Illness   Josue Crook is a 68 year old old male with a history of HTN, GERD, OA underwent Right TKA.     He has been in his usual state of health prior to presenting for this elective surgery.  He denies any associated symptoms prior to coming in today, and also denies any recent travel domestically or internationally, and also denies any recent sick contacts around him including any family or friends who have been sick.  When asked, he denies any fevers, rigors, chest pain or shortness of breath, nausea or vomiting or abdominal pain, changes in bowel movements/pattern, urinary symptoms, focal weakness, or any other new and associated symptoms at this time.  He has been compliant with his medications.  14 point review of systems performed with the patient without any other pertinent positives at this time.    Presently, he states his pain is currently well-tolerated.  He denies any new complaints or symptoms at this time.    His social history, family history, and past medical history were directly reviewed with him and corroborated to be accurate as documented below. All questions he had at this time were answered to his " verbalized and stated satisfaction and understanding.     Past Medical History    Past Medical History:   Diagnosis Date    Anemia     Arrhythmia     Arthritis     BPH (benign prostatic hyperplasia)     Celiac disease     Gastroesophageal reflux disease     Hyperlipidemia     Hypertension     Paroxysmal atrial fibrillation (H)     Sinus bradycardia        Past Surgical History   History reviewed. No pertinent surgical history.    Medications   I have reviewed this patient's current medications  Medications Prior to Admission   Medication Sig Dispense Refill Last Dose    acetaminophen (TYLENOL) 650 MG CR tablet Take 1,300 mg by mouth 3 times daily   9/4/2023 at am    apixaban ANTICOAGULANT (ELIQUIS) 5 MG tablet Take 5 mg by mouth 2 times daily One in the morning and one in the night  Last dose 8/31/23 before surgery   8/31/2023    cholecalciferol (VITAMIN D3) 25 mcg (1000 units) capsule Take 2 capsules by mouth daily   9/4/2023    diclofenac (VOLTAREN) 1 % topical gel Apply 4 g topically 4 times daily   9/4/2023 at not with    famotidine (PEPCID) 40 MG tablet Take 40 mg by mouth At Bedtime   9/4/2023    hydrochlorothiazide (HYDRODIURIL) 25 MG tablet Take 25 mg by mouth daily as needed (for edema)   Past Week    lactobacillus rhamnosus, GG, (CULTURELL) capsule Take 1 capsule by mouth daily   9/4/2023    Multiple Vitamins-Minerals (MULTIVITAL PO) Take 1 tablet by mouth daily Stopping 8/29/23 before surgery   8/29/2023    propranolol (INDERAL) 40 MG tablet Take 20 mg by mouth 2 times daily   9/5/2023 at am - in [facility]    tamsulosin (FLOMAX) 0.4 MG capsule Take 0.4 mg by mouth daily   9/4/2023    zinc gluconate 50 MG tablet Take 50 mg by mouth daily   9/4/2023             Physical Exam   Vital Signs: Temp: 99.3  F (37.4  C) Temp src: Temporal BP: (!) 144/60 Pulse: 59   Resp: 16 SpO2: 94 % O2 Device: None (Room air) Oxygen Delivery: 1 LPM  Weight: 239 lbs 14.4 oz    GENERAL:  Alert, appears comfortable, in no acute distress,  "appears stated age   HEAD:  Normocephalic, without obvious abnormality, atraumatic   EYES:  PERRL, conjunctiva/corneas clear, no scleral icterus, EOM's intact   NOSE: Nares normal, septum midline, mucosa normal, no drainage   THROAT: Lips, mucosa, and tongue normal; teeth and gums normal, mouth moist   NECK: Supple, symmetrical, trachea midline   BACK:   Symmetric, no curvature, ROM normal   LUNGS:   Clear to auscultation bilaterally, no rales, rhonchi, or wheezing, symmetric chest rise on inhalation, respirations unlabored   CHEST WALL:  No tenderness or deformity   HEART:  Regular rate and rhythm, S1 and S2 normal, no murmur, rub, or gallop    ABDOMEN:   Soft, non-tender, bowel sounds active all four quadrants, no masses, no organomegaly, no rebound or guarding   EXTREMITIES: Extremities normal, atraumatic, no cyanosis or edema    SKIN: Dry to touch, no exanthems in the visualized areas   NEURO: Alert, oriented x 4, moves all four extremities freely/spontaneously already   PSYCH: Cooperative, behavior is appropriate      Medical Decision Making       81 MINUTES SPENT BY ME on the date of service doing chart review, history, exam, documentation & further activities per the note.      Data         Imaging results reviewed over the past 24 hrs:   Recent Results (from the past 24 hour(s))   POC US Guidance Needle Placement    Narrative    Ultrasound was performed as guidance to an anesthesia procedure.  Click   \"PACS images\" hyperlink below to view any stored images.  For specific   procedure details, view procedure note authored by anesthesia.     "

## 2023-09-05 NOTE — ANESTHESIA PROCEDURE NOTES
"Intrathecal injection Procedure Note    Pre-Procedure   Staff -        Anesthesiologist:  Emmanuel Beard MD       Performed By: anesthesiologist       Location: OR       Procedure Start/Stop Times: 9/5/2023 7:19 AM and 9/5/2023 7:21 AM       Pre-Anesthestic Checklist: patient identified, IV checked, risks and benefits discussed, informed consent, monitors and equipment checked, pre-op evaluation, at physician/surgeon's request and post-op pain management  Timeout:       Correct Patient: Yes        Correct Procedure: Yes        Correct Site: Yes        Correct Position: Yes   Procedure Documentation  Procedure: intrathecal injection       Patient Position: sitting       Patient Prep/Sterile Barriers: sterile gloves, mask, patient draped       Skin prep: Chloraprep       Insertion Site: L3-4. (midline approach).       Needle Gauge: 24.        Needle Length (Inches): 4        Spinal Needle Type: Pencan       Introducer used       Introducer: 20 G       # of attempts: 1 and  # of redirects:  0    Assessment/Narrative         Paresthesias: No.       CSF fluid: clear.       Opening pressure was cmH2O while  Sitting.      Medication(s) Administered   0.75% Hyperbaric Bupivacaine (Intrathecal) - Intrathecal   1.8 mL - 9/5/2023 7:19:00 AM  Medication Administration Time: 9/5/2023 7:19 AM     Comments:  Single pass spinal. Aspiration of CSF pre and post medication injection. No pain or paresthesias upon placement. No complications.         FOR [facility] ([facility]) ONLY:   Pain Team Contact information: please page the Pain Team Via [employer]. Search \"Pain\". During daytime hours, please page the attending first. At night please page the resident first.      "

## 2023-09-05 NOTE — ANESTHESIA PREPROCEDURE EVALUATION
Anesthesia Pre-Procedure Evaluation    Patient: Josue Crook   MRN: [id number] : 1955        Procedure : Procedure(s):  RIGHT TOTAL KNEE ARTHROPLASTY          Past Medical History:   Diagnosis Date    Anemia     Arrhythmia     Arthritis     BPH (benign prostatic hyperplasia)     Celiac disease     Gastroesophageal reflux disease     Hyperlipidemia     Hypertension     Paroxysmal atrial fibrillation (H)     Sinus bradycardia       History reviewed. No pertinent surgical history.   No Known Allergies   Social History     Tobacco Use    Smoking status: Former     Types: Cigarettes    Smokeless tobacco: Never   Substance Use Topics    Alcohol use: Not Currently      Wt Readings from Last 1 Encounters:   23 108.8 kg (239 lb 14.4 oz)        Anesthesia Evaluation   Pt has had prior anesthetic.     No history of anesthetic complications       ROS/MED HX  ENT/Pulmonary:     (+)     KELLY risk factors,  hypertension, obese,                               Neurologic:    (-) no CVA   Cardiovascular:     (+) Dyslipidemia hypertension- -   -  - -   Taking blood thinners  Instructions Given to patient: Last dose of eliquis .                   dysrhythmias, a-fib,             METS/Exercise Tolerance:     Hematologic:  - neg hematologic  ROS     Musculoskeletal:   (+)  arthritis,             GI/Hepatic:     (+) GERD, Asymptomatic on medication,                  Renal/Genitourinary:  - neg Renal ROS     Endo:     (+)               Obesity,       Psychiatric/Substance Use:  - neg psychiatric ROS     Infectious Disease:  - neg infectious disease ROS     Malignancy:  - neg malignancy ROS     Other:  - neg other ROS          Physical Exam    Airway  airway exam normal      Mallampati: II   TM distance: > 3 FB   Neck ROM: full   Mouth opening: > 3 cm    Respiratory Devices and Support         Dental  no notable dental history     (+) Modest Abnormalities - crowns, retainers, 1 or 2 missing teeth      Cardiovascular    cardiovascular exam normal       Rhythm and rate: regular and normal     Pulmonary   pulmonary exam normal        breath sounds clear to auscultation           OUTSIDE LABS:  CBC:   Lab Results   Component Value Date    WBC 7.7 08/22/2023    WBC 9.2 04/26/2023    HGB 13.4 08/22/2023    HGB 13.6 04/26/2023    HCT 42.5 08/22/2023    HCT 45.1 04/26/2023     08/22/2023     04/26/2023     BMP:   Lab Results   Component Value Date     08/22/2023     04/26/2023    POTASSIUM 4.4 08/22/2023    POTASSIUM 4.4 04/26/2023    CHLORIDE 104 08/22/2023    CHLORIDE 103 04/26/2023    CO2 25 08/22/2023    CO2 24 04/26/2023    BUN 10.1 08/22/2023    BUN 11.7 04/26/2023    CR 0.81 08/22/2023    CR 0.80 04/26/2023     (H) 08/22/2023     (H) 04/26/2023     COAGS: No results found for: PTT, INR, FIBR  POC: No results found for: BGM, HCG, HCGS  HEPATIC:   Lab Results   Component Value Date    ALBUMIN 3.6 09/20/2022    PROTTOTAL 6.1 (L) 09/20/2022    ALT 22 09/20/2022    AST 18 09/20/2022    GGT 11 10/11/2022    ALKPHOS 140 (H) 09/20/2022    BILITOTAL 0.2 09/20/2022     OTHER:   Lab Results   Component Value Date    YAMIL 8.7 (L) 08/22/2023    MAG 1.8 03/19/2019    LIPASE 25 09/10/2020       Anesthesia Plan    ASA Status:  3    NPO Status:  NPO Appropriate    Anesthesia Type: Spinal.              Consents    Anesthesia Plan(s) and associated risks, benefits, and realistic alternatives discussed. Questions answered and patient/representative(s) expressed understanding.     - Discussed:     - Discussed with:  Patient            Postoperative Care    Pain management: IV analgesics, Oral pain medications, Multi-modal analgesia, Peripheral nerve block (Single Shot).   PONV prophylaxis: Ondansetron (or other 5HT-3), Dexamethasone or Solumedrol, Droperidol or Haldol     Comments:                MOISÉS PANDEY MD

## 2023-09-05 NOTE — ANESTHESIA PROCEDURE NOTES
Adductor canal Procedure Note    Pre-Procedure   Staff -        Anesthesiologist:  Emmanuel Beard MD       Performed By: anesthesiologist       Location: pre-op       Procedure Start/Stop Times: 9/5/2023 6:46 AM and 9/5/2023 6:48 AM       Pre-Anesthestic Checklist: patient identified, IV checked, site marked, risks and benefits discussed, informed consent, monitors and equipment checked, pre-op evaluation, at physician/surgeon's request and post-op pain management  Timeout:       Correct Patient: Yes        Correct Procedure: Yes        Correct Site: Yes        Correct Position: Yes        Correct Laterality: Yes        Site Marked: Yes  Procedure Documentation  Procedure: Adductor canal       Laterality: right       Patient Position: supine       Patient Prep/Sterile Barriers: sterile gloves, mask       Skin prep: Chloraprep       Needle Type: insulated       Needle Gauge: 20.        Needle Length (Inches): 4        Ultrasound guided       1. Ultrasound was used to identify targeted nerve, plexus, vascular marker, or fascial plane and place a needle adjacent to it in real-time.       2. Ultrasound was used to visualize the spread of anesthetic in close proximity to the above referenced structure.       3. A permanent image is entered into the patient's record.       4. The visualized anatomic structures appeared normal.       5. There were no apparent abnormal pathologic findings.    Assessment/Narrative         The placement was negative for: blood aspirated, painful injection and site bleeding       Paresthesias: No.       Bolus given via needle..        Secured via.        Insertion/Infusion Method: Single Shot       Complications: none       Injection made incrementally with aspirations every 5 mL.    Medication(s) Administered   Bupivacaine 0.5% PF (Infiltration) - Infiltration   15 mL - 9/5/2023 6:46:00 AM  Medication Administration Time: 9/5/2023 6:46 AM     Comments:  Negative aspiration every 5 ml of  "medication administered. No signs of LAST. No pain or paresthesias with block placement. No complications.         FOR [facility]) ONLY:   Pain Team Contact information: please page the Pain Team Via [employer]. Search \"Pain\". During daytime hours, please page the attending first. At night please page the resident first.      "

## 2023-09-05 NOTE — OP NOTE
Operative Note    PATIENT NAME:  BENJA RODRIGUEZ    PROCEDURE  #2 Right MINIMALLY INVASIVE TOTAL KNEEARTHROPLASTY    Pre-Procedure Diagnosis:  PRIMARY OSTEOARTHRITIS OF RIGHT KNEE    Post-Procedure Diagnosis:    PRIMARY OSTEOARTHRITIS OF RIGHT KNEE    Surgeon(s):  MD Boby Duffy PA-C  A PAC was necessary to ensure safety of this patient and adequate progression of the procedure.    Anesthesia Type:  Spinal    Complications:  None    Condition on discharge from OR:  Stable    Estimated Blood Loss:   50 cc    Specimens:    None       Drains:   Hemovac    Implants:  6 femur, 5 tibia, 10 mm insert, and 35 mm patella ([employer] knee)    Description:  After adequate anesthesia the rightknee was prepped and draped in the usual sterile fashion.  We proceeded with an MIS midline incision through the skin and subcutaneous tissue and performed a midvastus approach.  Thepatella was everted and 10 mm of patellar bone was resected.  The knee was flexed, we identified Whitesides line and drilled the 6 degree intramedullary alignment guide.  We made a cut onto the anterior cortex and then a distal femoral cut of 11 mm.  I then sized the knee at a 6 and made the anterior, posterior, and champfer cuts.  I turned attention to the tibia.  The extramedullary guide was utilized to alignment and the tibial plateau cutwas completed without difficulty.  A laminar  was placed and the posterior elements of the medial and lateral menisci and femoral osteophytes were removed.  I then injected the capsule with ropivicaine, morphine,toradol, and epi.  I trialed a 6 femur, a 5 tibia, and an 11 mm insert.  Alignment and soft tissue balance felt and looked excellent, so we marked and punched the components.  We then used pulsavac lavage to wash the bonysurfaces, we dried the bony surfaces and then cemented the tibia, the femur, and the patella in that order.  With the components cemented in place and all excess  cement removed, we let the cement cure.  I put the tourniquetdown and gained hemostasis.  Once the cement was cured, I locked the 10 mm insert and irrigated again.  We closed the arthrotomy with #1 ethibond and the VMO fascia with #1 vicryl.  The subQ wasclosed with 2.0 vicryl and the skin with staples.  Sterile bandages were applied and the patient was returned to the PACU in excellent condition.    PLAN:    WBAT  F/U in 2 weeks  Leave Aquacel for 10 days  Lovenox in hospital and resume Eliquis when discharged.

## 2023-09-06 ENCOUNTER — APPOINTMENT (OUTPATIENT)
Dept: OCCUPATIONAL THERAPY | Facility: CLINIC | Age: 68
End: 2023-09-06
Attending: ORTHOPAEDIC SURGERY
Payer: COMMERCIAL

## 2023-09-06 ENCOUNTER — APPOINTMENT (OUTPATIENT)
Dept: PHYSICAL THERAPY | Facility: CLINIC | Age: 68
End: 2023-09-06
Attending: ORTHOPAEDIC SURGERY
Payer: COMMERCIAL

## 2023-09-06 VITALS
SYSTOLIC BLOOD PRESSURE: 136 MMHG | DIASTOLIC BLOOD PRESSURE: 68 MMHG | BODY MASS INDEX: 36.36 KG/M2 | HEART RATE: 62 BPM | TEMPERATURE: 97.5 F | RESPIRATION RATE: 16 BRPM | WEIGHT: 239.9 LBS | OXYGEN SATURATION: 96 % | HEIGHT: 68 IN

## 2023-09-06 LAB
FASTING STATUS PATIENT QL REPORTED: YES
GLUCOSE BLD-MCNC: 135 MG/DL (ref 70–125)
HGB BLD-MCNC: 12.6 G/DL (ref 13.3–17.7)

## 2023-09-06 PROCEDURE — 97116 GAIT TRAINING THERAPY: CPT | Mod: GP

## 2023-09-06 PROCEDURE — 36415 COLL VENOUS BLD VENIPUNCTURE: CPT | Performed by: ORTHOPAEDIC SURGERY

## 2023-09-06 PROCEDURE — 250N000013 HC RX MED GY IP 250 OP 250 PS 637: Performed by: ORTHOPAEDIC SURGERY

## 2023-09-06 PROCEDURE — 97166 OT EVAL MOD COMPLEX 45 MIN: CPT | Mod: GO

## 2023-09-06 PROCEDURE — 85018 HEMOGLOBIN: CPT | Performed by: ORTHOPAEDIC SURGERY

## 2023-09-06 PROCEDURE — 250N000011 HC RX IP 250 OP 636: Mod: JZ | Performed by: ORTHOPAEDIC SURGERY

## 2023-09-06 PROCEDURE — 96372 THER/PROPH/DIAG INJ SC/IM: CPT | Performed by: ORTHOPAEDIC SURGERY

## 2023-09-06 PROCEDURE — 82947 ASSAY GLUCOSE BLOOD QUANT: CPT | Performed by: ORTHOPAEDIC SURGERY

## 2023-09-06 PROCEDURE — 97535 SELF CARE MNGMENT TRAINING: CPT | Mod: GO

## 2023-09-06 PROCEDURE — 97110 THERAPEUTIC EXERCISES: CPT | Mod: GP

## 2023-09-06 PROCEDURE — 99214 OFFICE O/P EST MOD 30 MIN: CPT | Performed by: HOSPITALIST

## 2023-09-06 PROCEDURE — 250N000013 HC RX MED GY IP 250 OP 250 PS 637: Performed by: HOSPITALIST

## 2023-09-06 RX ORDER — OXYCODONE HYDROCHLORIDE 5 MG/1
5-10 TABLET ORAL EVERY 4 HOURS PRN
Qty: 31 TABLET | Refills: 0 | Status: SHIPPED | OUTPATIENT
Start: 2023-09-06 | End: 2024-02-01

## 2023-09-06 RX ORDER — POLYETHYLENE GLYCOL 3350 17 G/17G
17 POWDER, FOR SOLUTION ORAL DAILY
Qty: 510 G | Refills: 0 | Status: SHIPPED | OUTPATIENT
Start: 2023-09-06 | End: 2024-02-01

## 2023-09-06 RX ORDER — AMOXICILLIN 250 MG
1 CAPSULE ORAL 2 TIMES DAILY
Refills: 0 | COMMUNITY
Start: 2023-09-06 | End: 2024-02-01

## 2023-09-06 RX ORDER — CELECOXIB 100 MG/1
100 CAPSULE ORAL 2 TIMES DAILY
Qty: 40 CAPSULE | Refills: 0 | Status: SHIPPED | OUTPATIENT
Start: 2023-09-06 | End: 2024-02-01

## 2023-09-06 RX ADMIN — OXYCODONE HYDROCHLORIDE 10 MG: 5 TABLET ORAL at 04:14

## 2023-09-06 RX ADMIN — ACETAMINOPHEN 975 MG: 325 TABLET ORAL at 11:47

## 2023-09-06 RX ADMIN — SENNOSIDES AND DOCUSATE SODIUM 1 TABLET: 50; 8.6 TABLET ORAL at 09:07

## 2023-09-06 RX ADMIN — TAMSULOSIN HYDROCHLORIDE 0.4 MG: 0.4 CAPSULE ORAL at 09:07

## 2023-09-06 RX ADMIN — ACETAMINOPHEN 975 MG: 325 TABLET ORAL at 04:13

## 2023-09-06 RX ADMIN — CELECOXIB 100 MG: 100 CAPSULE ORAL at 09:07

## 2023-09-06 RX ADMIN — POLYETHYLENE GLYCOL 3350 17 G: 17 POWDER, FOR SOLUTION ORAL at 09:07

## 2023-09-06 RX ADMIN — PROPRANOLOL HYDROCHLORIDE 20 MG: 20 TABLET ORAL at 09:07

## 2023-09-06 RX ADMIN — ENOXAPARIN SODIUM 40 MG: 100 INJECTION SUBCUTANEOUS at 06:38

## 2023-09-06 RX ADMIN — OXYCODONE HYDROCHLORIDE 10 MG: 5 TABLET ORAL at 09:07

## 2023-09-06 ASSESSMENT — ACTIVITIES OF DAILY LIVING (ADL)
ADLS_ACUITY_SCORE: 22
ADLS_ACUITY_SCORE: 22
ADLS_ACUITY_SCORE: 20
ADLS_ACUITY_SCORE: 22

## 2023-09-06 NOTE — PROGRESS NOTES
Care Management Discharge Note    Discharge Date: 09/06/2023       Discharge Disposition: Home    Discharge Services: None    Discharge DME: None    Discharge Transportation: family or friend will provide    Education Provided on the Discharge Plan: Yes (AVS per bedside RN)    Persons Notified of Discharge Plans: patient    Patient/Family in Agreement with the Plan: yes        Additional Information:  CM reviewed chart. No CM needs identified or requested by Ortho team. Family will provide transportation home at discharge.     Chichi Tobar RN

## 2023-09-06 NOTE — PLAN OF CARE
Patient vital signs are at baseline: Yes; except bradycardic. In 50's.   Patient able to ambulate as they were prior to admission or with assist devices provided by therapies during their stay:  Yes  Patient MUST void prior to discharge:  Yes  Patient able to tolerate oral intake:  Yes  Pain has adequate pain control using Oral analgesics:  Yes  Does patient have an identified :  Yes  Has goal D/C date and time been discussed with patient:  Yes  Goal Outcome Evaluation:       Pt alert and oriented X 4. Pain controlled with scheduled Tylenol and PRN Oxycodone. Voiding and walking. Up with SBA. Dressing CDI. Calls appropriately.

## 2023-09-06 NOTE — PROGRESS NOTES
Patient has been discharged.     Patient and son understood discharge instructions as evidence by nodding, asking questions and stating verbal readiness.     Patient pain controlled by oral pain meds prior to discharge as well as ice, elevation, repositioning and ambulation.

## 2023-09-06 NOTE — PROGRESS NOTES
09/06/23 0815   Appointment Info   Signing Clinician's Name / Credentials (OT) AGUSTO Mckay/L, CLT   Rehab Comments (OT) OT min   Quick Adds   Quick Adds Certification   Living Environment   People in Home alone   Current Living Arrangements house   Transportation Anticipated family or friend will provide   Living Environment Comments son will say with pt few days   Self-Care   Usual Activity Tolerance good   Current Activity Tolerance moderate   Equipment Currently Used at Home grab bar, toilet;grab bar, tub/shower;raised toilet seat;shower chair;walker, rolling  (4WW)   Activity/Exercise/Self-Care Comment indep with all I/ADL   General Information   Onset of Illness/Injury or Date of Surgery 09/05/23   Referring Physician Dr. Haney   Patient/Family Therapy Goal Statement (OT) home with son today   Additional Occupational Profile Info/Pertinent History of Current Problem TKA   Existing Precautions/Restrictions weight bearing   Right Lower Extremity (Weight-bearing Status) weight-bearing as tolerated (WBAT)   Cognitive Status Examination   Orientation Status orientation to person, place and time   Affect/Mental Status (Cognitive) WFL   Visual Perception   Visual Impairment/Limitations WFL;corrective lenses full-time   Sensory   Sensory Quick Adds sensation intact   Pain Assessment   Patient Currently in Pain No   Posture   Posture not impaired   Range of Motion Comprehensive   General Range of Motion no range of motion deficits identified   Strength Comprehensive (MMT)   General Manual Muscle Testing (MMT) Assessment no strength deficits identified   Muscle Tone Assessment   Muscle Tone Quick Adds No deficits were identified   Coordination   Upper Extremity Coordination No deficits were identified   Bed Mobility   Comment (Bed Mobility) SBA   Transfers   Transfer Comments SBA   Balance   Balance Comments decreased   Activities of Daily Living   BADL Assessment/Intervention lower body dressing   Lower  Body Dressing Assessment/Training   Hardin Level (Lower Body Dressing) supervision   Clinical Impression   Criteria for Skilled Therapeutic Interventions Met (OT) Yes, treatment indicated   OT Diagnosis decr ADL indep/safety   Influenced by the following impairments s/p TKA   OT Problem List-Impairments impacting ADL activity tolerance impaired;flexibility;mobility;post-surgical precautions   Assessment of Occupational Performance 3-5 Performance Deficits   Identified Performance Deficits dressing, home mgmt, bathing, func mob   Planned Therapy Interventions (OT) ADL retraining;bed mobility training;transfer training   Clinical Decision Making Complexity (OT) moderate complexity   Risk & Benefits of therapy have been explained care plan/treatment goals reviewed;patient;son   OT Total Evaluation Time   OT Eval, Moderate Complexity Minutes (65189) 10   Therapy Certification   Medical Diagnosis TKa   Start of Care Date 09/06/23   Certification date from 09/06/23   Certification date to 10/06/23   OT Goals   Therapy Frequency (OT) One time eval and treatment   OT Predicted Duration/Target Date for Goal Attainment 09/06/23   OT Goals Lower Body Dressing;Toilet Transfer/Toileting   OT: Lower Body Dressing Modified independent;within precautions;Goal Met;Completed;using adaptive equipment   OT: Toilet Transfer/Toileting Modified independent;toilet transfer;within precautions;Goal Met;Completed;using adaptive equipment   Interventions   Interventions Quick Adds Self-Care/Home Management   Self-Care/Home Management   Self-Care/Home Mgmt/ADL, Compensatory, Meal Prep Minutes (59090) 35   Symptoms Noted During/After Treatment (Meal Preparation/Planning Training) none   Treatment Detail/Skilled Intervention Educ pt in PLB for incr pain. Educated pt in all transfers including bed, toilet, chair, car, and tub/shower. Pt mod I with FWW for bed, toilet, tub/shower, chair with cues for tech after instruction. Educ in LE  dressing with precautions for post-surgery. Pt mod I for LE dressing including shorts/shoes/underwear using AE after instruction.  UB dress with mod I after set-up. Bed mobility mod I with AE after instruction and educ in safe positioning.  All questions answered.  All tasks requ'd incr time/effort second to pain/fatigue.   OT Discharge Planning   OT Plan OT d/c   OT Discharge Recommendation (DC Rec)   (defer to ortho)   OT Rationale for DC Rec pt has support from son and appears to be funtioning safely at this time after surgery   OT Brief overview of current status mod I with BADL   Total Session Time   Timed Code Treatment Minutes 35   Total Session Time (sum of timed and untimed services) 45    [facility]  OUTPATIENT OCCUPATIONAL THERAPY  EVALUATION  PLAN OF TREATMENT FOR OUTPATIENT REHABILITATION  (COMPLETE FOR INITIAL CLAIMS ONLY)  Patient's Last Name, First Name, M.I.  YOB: 1955  Josue Crook                          Provider's Name  [facility] Medical Record No.  [id number]                             Onset Date:  09/05/23   Start of Care Date:  09/06/23   Type:     ___PT   _X_OT   ___SLP Medical Diagnosis:  TKa                    OT Diagnosis:  decr ADL indep/safety Visits from SOC:  1     See note for plan of treatment, functional goals and certification details    I CERTIFY THE NEED FOR THESE SERVICES FURNISHED UNDER        THIS PLAN OF TREATMENT AND WHILE UNDER MY CARE     (Physician co-signature of this document indicates review and certification of the therapy plan).

## 2023-09-06 NOTE — PROGRESS NOTES
Occupational Therapy Discharge Summary    Reason for therapy discharge:    Discharged to home.    Progress towards therapy goal(s). See goals on Care Plan in [facility] electronic health record for goal details.  Goals met    Therapy recommendation(s):    No further therapy is recommended.

## 2023-09-06 NOTE — DISCHARGE SUMMARY
[facility] Orthopedics Discharge Summary                                  [facility]     TRAVIS RODRIGUEZ [id number]   Age: 68 year old  PCP: Inge Alfaro, [phone] 1955     Date of Admission:  9/5/2023  Date of Discharge::  9/6/2023  Discharge Provider:  THERESA Herrera CNP    Code status:  Full Code    Admission Information:  Admission Diagnosis:  Osteoarthritis of right knee [M17.11]  Primary osteoarthritis of one knee, right [M17.11]    Post-Operative Day: 1 Day Post-Op     Reason for admission:  The patient was admitted for the following:Procedure(s) (LRB):  RIGHT TOTAL KNEE ARTHROPLASTY (Right)    Principal Problem:    Primary osteoarthritis of one knee, right  Active Problems:    Primary hypertension    Gastroesophageal reflux disease without esophagitis    Status post knee surgery      Allergies:  Gluten meal    Following the procedure noted above the patient was transferred to the post-op floor and started on:    Therapy:  physical therapy and occupational therapy  Anticoagulation Plan: Resume pre op Eliquis. One dose of Lovenox IP.   Pain Management: scopainmedication: oxycodone, tylenol, and celebrex  Weight bearing status: Weight bearing as tolerated     The patient was followed by Orthopedics during the inpatient treatment course:  Complications:  Acute Blood Loss Anemia: non symptomatic, no further treatment or monitoring necessary at this time.     Additional consultations:  [facility] [place], expertise and consultation appreciated.        Pertinent Labs   Lab Results: personally reviewed.     Recent Labs   Lab Test 09/06/23  0714 08/22/23  0910 04/26/23  1130 12/27/22  0900 12/21/22  0913   HGB 12.6* 13.4 13.6 9.9* 10.1*   HCT  --  42.5 45.1 35.0* 35.3*   MCV  --  99 93 85 85   PLT  --  281 309 350 375   NA  --  140 139  --  141          Discharge Information:  Condition at discharge: Stable  Discharge destination:  Discharged to home     Medications at discharge:  Discharge  Medication List as of 9/6/2023 11:54 AM        START taking these medications    Details   celecoxib (CELEBREX) 100 MG capsule Take 1 capsule (100 mg) by mouth 2 times daily for 20 days Do not take at the same time of day as your Eliquis., Disp-40 capsule, R-0, E-Prescribe      oxyCODONE (ROXICODONE) 5 MG tablet Take 1-2 tablets (5-10 mg) by mouth every 4 hours as needed for moderate to severe pain Take 1 pill for moderate pain (4-6/10) and 2 pills for severe pain (7-10/10). Maximum 6 pills per day. Alternate with Tylenol., Disp-31 tablet, R-0, E-Prescribe      polyethylene glycol (MIRALAX) 17 GM/Dose powder Take 17 g by mouth daily Take while taking opioid medications and until bowels are back to normal routine. Hold for loose stools, Disp-510 g, R-0, E-Prescribe      senna-docusate (SENOKOT-S/PERICOLACE) 8.6-50 MG tablet Take 1 tablet by mouth 2 times daily Take while taking opioid medications and until bowels are back to normal routine. Hold for loose stools, R-0, OTC           CONTINUE these medications which have NOT CHANGED    Details   acetaminophen (TYLENOL) 650 MG CR tablet Take 1,300 mg by mouth 3 times daily, Historical      apixaban ANTICOAGULANT (ELIQUIS) 5 MG tablet Take 5 mg by mouth 2 times daily One in the morning and one in the night  Last dose 8/31/23 before surgery, Historical      cholecalciferol (VITAMIN D3) 25 mcg (1000 units) capsule Take 2 capsules by mouth daily, Historical      diclofenac (VOLTAREN) 1 % topical gel Apply 4 g topically 4 times daily, Historical      famotidine (PEPCID) 40 MG tablet Take 40 mg by mouth At Bedtime, Historical      hydrochlorothiazide (HYDRODIURIL) 25 MG tablet Take 25 mg by mouth daily as needed (for edema), Historical      lactobacillus rhamnosus, GG, (CULTURELL) capsule Take 1 capsule by mouth daily, Historical      Multiple Vitamins-Minerals (MULTIVITAL PO) Take 1 tablet by mouth daily Stopping 8/29/23 before surgery, Historical      propranolol (INDERAL)  40 MG tablet Take 20 mg by mouth 2 times daily, Historical      tamsulosin (FLOMAX) 0.4 MG capsule Take 0.4 mg by mouth daily, Historical      zinc gluconate 50 MG tablet Take 50 mg by mouth daily, Historical           Follow-Up Care:  Patient should be seen in the office in 10-14 days by the Orthopedic Surgeon/Physician Assistant.  Call [phone] for appointment or questions.    It was my pleasure to take care of the above patient.  THERESA Herrera CNP

## 2023-09-06 NOTE — PROGRESS NOTES
[facility]    Medicine Progress Note - Hospitalist Service    Date of Admission:  9/5/2023    Assessment & Plan   Josue Crook is a 68 year old old male with a history of HTN, GERD, OA underwent Right TKA. [facility] [place] service was asked to evaluate patient for postoperative medical management as follows below. Please resume the home medications as reconciled and further noted below with ordered hold parameters.  Vital signs have been stable post-operatively including hemodynamically stable blood pressure and heart rate. Thank you for this consult; we will continue to follow this patient until discharge.    HTN  -Order home medications with the written tiered hold parameters given risk for post-operative hypotension.     GERD  -Order home antacids/medications. Utilize formulary while inpatient.    S/p Right TKA  Knee OA  Acute Post-Op Pain  -Agree with current pain control regimen; consider acute pain team consultation if increasingly difficult to control or proves refractory.   -PT evaluation.   -OT evaluation.  -IS frequently, initially every hour while awake as tolerated. Directly encouraged and discussed.      Post-Op DVT Prophylaxis  -As per primary surgery team.      Procedure(s):  RIGHT TOTAL KNEE ARTHROPLASTY  Post-operative Day: Day of Surgery  Code status:Full Code     Type of Anesthesia   Spinal    Estimated Blood Loss:  * No values recorded between 9/5/2023  7:43 AM and 9/5/2023  8:55 AM *    Hospital Problem List   No problem-specific Assessment & Plan notes found for this encounter.    Principal Problem:    Primary osteoarthritis of one knee, right  Active Problems:    Primary hypertension    Gastroesophageal reflux disease without esophagitis    Status post knee surgery      -Reviewed the patient's preoperative H and P and updated missing elements.  -Home medication reconciliation has been reviewed.  Medications have been ordered as noted from the home list and changes are  "documented above        Diet: Advance Diet as Tolerated: Regular Diet Adult    DVT Prophylaxis: Defer to primary service  Trent Catheter: Not present  Lines: None     Cardiac Monitoring: None  Code Status: Full Code      Clinically Significant Risk Factors Present on Admission               # Drug Induced Coagulation Defect: home medication list includes an anticoagulant medication    # Hypertension: Noted on problem list      # Obesity: Estimated body mass index is 36.48 kg/m  as calculated from the following:    Height as of this encounter: 1.727 m (5' 8\").    Weight as of this encounter: 108.8 kg (239 lb 14.4 oz).              Disposition Plan      Expected Discharge Date: 09/06/2023    Discharge Delays: *Early Discharge Anticipated              Bloa Kent MD  Hospitalist Service  [facility]  Securely message with mohchi (more info)  Text page via [employer] Paging/Directory   ______________________________________________________________________    Interval History   No acute events overnight.    No report of chest pain, shortness of breath, or other new complaints. Medication reconciliation completed for anticipated discharge and discussed with patient as well as counseling regarding lifestyle modifications and behaviors in setting of post-operative recovery in the coming weeks, outpatient rehabilitative follow-up plan pending final PT/OT recommendations, and follow-up in clinic with the primary care provider and with surgery as scheduled. All questions were answered to stated and verbalized satisfaction.       Physical Exam   Vital Signs: Temp: 97.4  F (36.3  C) Temp src: Oral BP: 130/57 Pulse: 54   Resp: 18 SpO2: 91 % O2 Device: None (Room air) Oxygen Delivery: 1 LPM  Weight: 239 lbs 14.4 oz    GENERAL:  Alert, appears comfortable, in no acute distress, appears stated age   HEAD:  Normocephalic, without obvious abnormality, atraumatic   EYES:  PERRL, conjunctiva/corneas clear, no " scleral icterus, EOM's intact   NOSE: Nares normal, septum midline, mucosa normal, no drainage   THROAT: Lips, mucosa, and tongue normal; teeth and gums normal, mouth moist   NECK: Supple, symmetrical, trachea midline   BACK:   Symmetric, no curvature, ROM normal   LUNGS:   Clear to auscultation bilaterally, no rales, rhonchi, or wheezing, symmetric chest rise on inhalation, respirations unlabored   CHEST WALL:  No tenderness or deformity   HEART:  Regular rate and rhythm, S1 and S2 normal, no murmur, rub, or gallop    ABDOMEN:   Soft, non-tender, bowel sounds active all four quadrants, no masses, no organomegaly, no rebound or guarding   EXTREMITIES: Extremities normal, atraumatic, no cyanosis or edema    SKIN: Dry to touch, no exanthems in the visualized areas   NEURO: Alert, oriented x 4, moves all four extremities freely/spontaneously   PSYCH: Cooperative, behavior is appropriate          Medical Decision Making       36 MINUTES SPENT BY ME on the date of service doing chart review, history, exam, documentation & further activities per the note.      Data     I have personally reviewed the following data over the past 24 hrs:    N/A  \   12.6 (L)   / N/A     N/A N/A N/A /  135 (H)   N/A N/A N/A \       Imaging results reviewed over the past 24 hrs:   No results found for this or any previous visit (from the past 24 hour(s)).

## 2023-09-06 NOTE — PLAN OF CARE
Patient vital signs are at baseline: Yes  Patient able to ambulate as they were prior to admission or with assist devices provided by therapies during their stay:  Yes  Patient MUST void prior to discharge:  Yes  Patient able to tolerate oral intake:  Yes  Pain has adequate pain control using Oral analgesics:  Yes  Does patient have an identified :  Yes  Has goal D/C date and time been discussed with patient:  Yes        Son is in room for discharge. All medications given, passed PT/OT, Assessments completed. Update given to Oanh RN, to transport pt to the discharge lounge.

## 2023-09-06 NOTE — PROGRESS NOTES
Physical Therapy Discharge Summary    Reason for therapy discharge:    All goals and outcomes met, no further needs identified.    Progress towards therapy goal(s). See goals on Care Plan in [facility] electronic health record for goal details.  Goals met    Therapy recommendation(s):    Continue with home ex and amb program as instructed. Has OP PT already set up.

## 2023-09-06 NOTE — PROGRESS NOTES
"[facility] Orthopaedics Progress Note      Post-operative Day: 1 Day Post-Op    Procedure(s):  RIGHT TOTAL KNEE ARTHROPLASTY        Plan: Anticoagulation protocol: IP one dose of Lovenox; Resume pre op Eliquis  on POD 2.   Acute Blood Loss Anemia: non symptomatic, no further treatment or monitoring necessary at this time.             Pain medications: oxycodone, tylenol, and celebrex            Weight bearing status:  WBAT            Disposition:  home today with support.              Continue cares and rehabilitation     Subjective:  Rigoberto is seated in his recliner, he reports feeling very well today.   Pain: minimal and moderate  Chest pain, SOB:  No  Denies lightheadedness/dizziness  Denies nausea/ vomiting  Passing flatus  voiding well    Objective:  Blood pressure 133/63, pulse 62, temperature 97.9  F (36.6  C), temperature source Oral, resp. rate 18, height 1.727 m (5' 8\"), weight 108.8 kg (239 lb 14.4 oz), SpO2 95 %.    Patient Vitals for the past 24 hrs:   BP Temp Temp src Pulse Resp SpO2   09/06/23 0854 133/63 97.9  F (36.6  C) Oral 62 18 95 %   09/05/23 2336 130/57 97.4  F (36.3  C) Oral 54 18 91 %   09/05/23 2046 126/60 -- -- 59 -- --   09/05/23 2000 109/55 97.4  F (36.3  C) Oral 59 18 92 %   09/05/23 1600 128/63 97.2  F (36.2  C) Oral 57 18 95 %   09/05/23 1500 126/60 -- -- 55 18 93 %   09/05/23 1400 131/61 -- -- 53 18 94 %   09/05/23 1330 132/77 97.3  F (36.3  C) Axillary 58 18 92 %   09/05/23 1300 134/63 97.5  F (36.4  C) Oral 61 18 94 %   09/05/23 1200 (!) 144/60 -- -- 59 16 94 %   09/05/23 1100 (!) 146/73 -- -- 57 16 96 %   09/05/23 1030 (!) 144/67 -- -- 59 18 96 %   09/05/23 1000 (!) 146/70 -- -- 60 20 97 %       Wt Readings from Last 4 Encounters:   09/05/23 108.8 kg (239 lb 14.4 oz)   10/24/22 110.7 kg (244 lb)         Motor function, sensation, and circulation intact   Yes  Wound status: Aquacel is clean, without shadowing, dry, and intact. Yes  Calf tenderness: Bilateral  No    Pertinent Labs "   Lab Results: personally reviewed.     Recent Labs   Lab Test 09/06/23  0714 08/22/23  0910 04/26/23  1130 12/27/22  0900 12/21/22  0913   HGB 12.6* 13.4 13.6 9.9* 10.1*   HCT  --  42.5 45.1 35.0* 35.3*   MCV  --  99 93 85 85   PLT  --  281 309 350 375   NA  --  140 139  --  141       Report completed by:  THERESA Herrera CNP  Date: 9/6/2023

## 2023-10-16 ENCOUNTER — TRANSFERRED RECORDS (OUTPATIENT)
Dept: HEALTH INFORMATION MANAGEMENT | Facility: CLINIC | Age: 68
End: 2023-10-16

## 2024-01-18 ENCOUNTER — LAB REQUISITION (OUTPATIENT)
Dept: LAB | Facility: CLINIC | Age: 69
End: 2024-01-18
Payer: COMMERCIAL

## 2024-01-18 DIAGNOSIS — E78.5 HYPERLIPIDEMIA, UNSPECIFIED: ICD-10-CM

## 2024-01-18 DIAGNOSIS — E55.9 VITAMIN D DEFICIENCY, UNSPECIFIED: ICD-10-CM

## 2024-01-18 PROCEDURE — 80053 COMPREHEN METABOLIC PANEL: CPT | Mod: ORL | Performed by: NURSE PRACTITIONER

## 2024-01-18 PROCEDURE — 82306 VITAMIN D 25 HYDROXY: CPT | Mod: ORL | Performed by: NURSE PRACTITIONER

## 2024-01-18 PROCEDURE — 80061 LIPID PANEL: CPT | Mod: ORL | Performed by: NURSE PRACTITIONER

## 2024-01-19 LAB
ALBUMIN SERPL BCG-MCNC: 3.7 G/DL (ref 3.5–5.2)
ALP SERPL-CCNC: 105 U/L (ref 40–150)
ALT SERPL W P-5'-P-CCNC: 21 U/L (ref 0–70)
ANION GAP SERPL CALCULATED.3IONS-SCNC: 14 MMOL/L (ref 7–15)
AST SERPL W P-5'-P-CCNC: 20 U/L (ref 0–45)
BILIRUB SERPL-MCNC: 0.3 MG/DL
BUN SERPL-MCNC: 10.2 MG/DL (ref 8–23)
CALCIUM SERPL-MCNC: 9.1 MG/DL (ref 8.8–10.2)
CHLORIDE SERPL-SCNC: 101 MMOL/L (ref 98–107)
CHOLEST SERPL-MCNC: 162 MG/DL
CREAT SERPL-MCNC: 0.83 MG/DL (ref 0.67–1.17)
DEPRECATED HCO3 PLAS-SCNC: 23 MMOL/L (ref 22–29)
EGFRCR SERPLBLD CKD-EPI 2021: >90 ML/MIN/1.73M2
FASTING STATUS PATIENT QL REPORTED: NORMAL
GLUCOSE SERPL-MCNC: 105 MG/DL (ref 70–99)
HDLC SERPL-MCNC: 45 MG/DL
LDLC SERPL CALC-MCNC: 100 MG/DL
NONHDLC SERPL-MCNC: 117 MG/DL
POTASSIUM SERPL-SCNC: 4.4 MMOL/L (ref 3.4–5.3)
PROT SERPL-MCNC: 6.5 G/DL (ref 6.4–8.3)
SODIUM SERPL-SCNC: 138 MMOL/L (ref 135–145)
TRIGL SERPL-MCNC: 84 MG/DL
VIT D+METAB SERPL-MCNC: 26 NG/ML (ref 20–50)

## 2024-02-01 ENCOUNTER — OFFICE VISIT (OUTPATIENT)
Dept: CARDIOLOGY | Facility: CLINIC | Age: 69
End: 2024-02-01
Payer: COMMERCIAL

## 2024-02-01 VITALS
DIASTOLIC BLOOD PRESSURE: 60 MMHG | HEART RATE: 61 BPM | OXYGEN SATURATION: 95 % | WEIGHT: 247 LBS | RESPIRATION RATE: 18 BRPM | SYSTOLIC BLOOD PRESSURE: 136 MMHG | BODY MASS INDEX: 37.56 KG/M2

## 2024-02-01 DIAGNOSIS — I10 PRIMARY HYPERTENSION: Primary | ICD-10-CM

## 2024-02-01 PROBLEM — E66.812 CLASS 2 SEVERE OBESITY DUE TO EXCESS CALORIES WITH SERIOUS COMORBIDITY IN ADULT (H): Status: ACTIVE | Noted: 2024-02-01

## 2024-02-01 PROBLEM — E66.01 CLASS 2 SEVERE OBESITY DUE TO EXCESS CALORIES WITH SERIOUS COMORBIDITY IN ADULT (H): Status: ACTIVE | Noted: 2024-02-01

## 2024-02-01 PROCEDURE — 99213 OFFICE O/P EST LOW 20 MIN: CPT | Performed by: INTERNAL MEDICINE

## 2024-02-01 RX ORDER — PROPRANOLOL HYDROCHLORIDE 20 MG/1
1 TABLET ORAL
COMMUNITY
Start: 2023-12-20

## 2024-02-01 NOTE — LETTER
2/1/2024    Inge Alfaro, NP  [address] [place] [address]    RE: Josue Aponteglynn       Dear Colleague,     I had the pleasure of seeing Josue Crook in the [facility].    Cardiology Clinic Office Note    Assessment / Plan:    1.  History of paroxysmal atrial fibrillation, reported on 1 occasion during an echocardiogram, which I been unable to confirm on other assessments.  Continues Eliquis with no obvious adverse effects.  No evidence of recurrence over the last year and a half.  Could consider trial off of anticoagulation, with daily pulse check.  Will continue for now, but low threshold to discontinue it if adverse effects arise  2.  Hypertension, well-controlled on low-dose propranolol plus hydrochlorothiazide.  3.  obesity, weight up 3 pounds over the last year and a half    Follow-up as needed    ______________________________________________________________________    Subjective:    I had the opportunity to see Josue Crook at the [facility]. Josue Crook is a 68 year old male with a history of morbid obesity and hypertension, maintained on propranolol.  I saw him last 10/2022 when he was reported to have had an episode of atrial fibrillation.  He was experiencing some episodes of lightheadedness.  Subsequent Holter monitor showed no evidence of atrial fibrillation, and outside echocardiogram reportedly showed atrial fibrillation with otherwise normal ventricular function and left atrial enlargement was noted.  He returns today for routine follow-up.    Since I saw him last, he has had no recurrent palpitations or lightheadedness.  He has felt well, exercising on his stationary bicycle 2 or 3 times a day for total of 30 minutes.  His activity tolerance is stable, and he is recovering well from his first total knee surgery preparing for the next 1.    His appetite is stable.  Weight is up 3 pounds from a year ago.  He has had no  chest pains.  He has tolerated Eliquis with minimal additional bruising.  ______________________________________________________________________    Problem List:  Patient Active Problem List   Diagnosis    Primary osteoarthritis of one knee, right    Primary hypertension    Gastroesophageal reflux disease without esophagitis    Status post knee surgery     Medical History:  Past Medical History:   Diagnosis Date    Anemia     Arrhythmia     Arthritis     BPH (benign prostatic hyperplasia)     Celiac disease     Gastroesophageal reflux disease     Hyperlipidemia     Hypertension     Paroxysmal atrial fibrillation (H)     Sinus bradycardia      Surgical History:  Past Surgical History:   Procedure Laterality Date    ARTHROPLASTY KNEE Right 9/5/2023    Procedure: RIGHT TOTAL KNEE ARTHROPLASTY;  Surgeon: Julius Haney MD;  Location: [facility] Main OR     Social History:  Social History     Socioeconomic History    Marital status:      Spouse name: Not on file    Number of children: Not on file    Years of education: Not on file    Highest education level: Not on file   Occupational History    Not on file   Tobacco Use    Smoking status: Former     Types: Cigarettes     Passive exposure: Past    Smokeless tobacco: Never   Vaping Use    Vaping Use: Never used   Substance and Sexual Activity    Alcohol use: Not Currently    Drug use: Never    Sexual activity: Not on file   Other Topics Concern    Not on file   Social History Narrative    Not on file     Social Determinants of Health     Financial Resource Strain: Not on file   Food Insecurity: Not on file   Transportation Needs: Not on file   Physical Activity: Not on file   Stress: Not on file   Social Connections: Not on file   Interpersonal Safety: Not on file   Housing Stability: Not on file     Sleep History:  Restorative supine with no snoring or apnea noted.  Takes a Tylenol PM before bedtime.  Exercise History:  Rides stationary bicycle 2-3 times a day  for total of 30 minutes with stable activity tolerance    Review of Systems:      12 point review of systems otherwise negative     Please refer above for cardiac ROS details.         Family History:  No family history on file.      Allergies:  Allergies   Allergen Reactions    Gluten Meal Other (See Comments)     Celiac's Dx 2023     Medications:  Current Outpatient Medications   Medication Sig Dispense Refill    acetaminophen (TYLENOL) 650 MG CR tablet Take 2 tablets by mouth every morning      apixaban ANTICOAGULANT (ELIQUIS) 5 MG tablet Take 5 mg by mouth 2 times daily One in the morning and one in the night  Last dose 8/31/23 before surgery      cholecalciferol (VITAMIN D3) 25 mcg (1000 units) capsule Take 2 capsules by mouth daily      diphenhydrAMINE-acetaminophen (TYLENOL PM)  MG tablet Take 1 tablet by mouth nightly as needed for sleep      famotidine (PEPCID) 40 MG tablet Take 40 mg by mouth At Bedtime      hydrochlorothiazide (HYDRODIURIL) 25 MG tablet Take 25 mg by mouth daily as needed (for edema)      lactobacillus rhamnosus, GG, (CULTURELL) capsule Take 1 capsule by mouth daily      Multiple Vitamins-Minerals (MULTIVITAL PO) Take 1 tablet by mouth daily Stopping 8/29/23 before surgery      propranolol (INDERAL) 20 MG tablet Take 1 tablet by mouth 2 times daily      tamsulosin (FLOMAX) 0.4 MG capsule Take 0.4 mg by mouth daily      zinc gluconate 50 MG tablet Take 50 mg by mouth daily         Objective:   Wt Readings from Last 3 Encounters:   02/01/24 112 kg (247 lb)   09/05/23 108.8 kg (239 lb 14.4 oz)   10/24/22 110.7 kg (244 lb)     Vital signs:  /60 (BP Location: Right arm, Patient Position: Sitting, Cuff Size: Adult Large)   Pulse 61   Resp 18   Wt 112 kg (247 lb)   SpO2 95%   BMI 37.56 kg/m        Physical Exam:    General Appearance : Awake, Alert, No acute distress  HEENT: No Scleral icterus; the mucous membranes were pink and moist.  Conjunctivae not injected  Neck: Stout.  No  cervical bruits, jugular venous distention, or thyromegaly   Chest: The spine was straight. Chest wall symmetric  Lungs: Respirations unlabored; the lungs are clear to auscultation.  No wheezing   Cardiovascular: Nonpalpable point of maximal impulse.  Auscultation reveals regular first and second heart sounds with no murmurs, rubs, or gallops.  Carotid, radial, and dorsalis pedal pulses are intact and symmetric.    Abdomen: Obese.  No organomegaly, masses, bruits, or tenderness. Bowels sounds are present  Extremities:  No clubbing, cyanosis.  No edema  Skin: No rash, bruising  Musculoskeletal: No tenderness.  Neurologic: Alert and oriented ×3. Speech is fluent.    Lab Results:  LIPIDS:  Lab Results   Component Value Date    CHOL 162 01/18/2024    CHOL 166 08/22/2023    CHOL 147 09/20/2022     Lab Results   Component Value Date    HDL 45 01/18/2024    HDL 41 08/22/2023    HDL 44 09/20/2022     Lab Results   Component Value Date     01/18/2024     (H) 08/22/2023    LDL 95 09/20/2022     Lab Results   Component Value Date    TRIG 84 01/18/2024    TRIG 74 08/22/2023    TRIG 41 09/20/2022 8/22/2023: Sinus bradycardia 49 bpm.  Sinus arrhythmia    Holter monitor 10/2022:  1. Essentially normal Holter monitor recording. 2. The study data contained no symptomatic recordings. There was no evidence of atrial fibrillation or flutter. 3. Rare supraventricular ectopy and no sustained atrial tachyarrhythmia. 4. Rare ventricular ectopy and no complex or sustained ventricular tachyarrhythmia. 5. No profound bradycardia or significant pauses.    Echocardiogram at [facility] 9/27/2022: Reported to show atrial fibrillation.  Normal left ventricular ejection fraction at 64%, left atrial enlargement with no significant valvular abnormalities.       DARÍO EDGAR MD [facility]  [phone]    This note created using Dragon voice recognition software.  Sound alike errors may have escaped editing.             Thank you for  allowing me to participate in the care of your patient.      Sincerely,     Raleigh Feng MD     [facility]  cc:   No referring provider defined for this encounter.

## 2024-02-01 NOTE — PROGRESS NOTES
Cardiology Clinic Office Note    Assessment / Plan:    1.  History of paroxysmal atrial fibrillation, reported on 1 occasion during an echocardiogram, which I been unable to confirm on other assessments.  Continues Eliquis with no obvious adverse effects.  No evidence of recurrence over the last year and a half.  Could consider trial off of anticoagulation, with daily pulse check.  Will continue for now, but low threshold to discontinue it if adverse effects arise  2.  Hypertension, well-controlled on low-dose propranolol plus hydrochlorothiazide.  3.  obesity, weight up 3 pounds over the last year and a half    Follow-up as needed    ______________________________________________________________________    Subjective:    I had the opportunity to see Josue Crook at the [facility]. Josue Crook is a 68 year old male with a history of morbid obesity and hypertension, maintained on propranolol.  I saw him last 10/2022 when he was reported to have had an episode of atrial fibrillation.  He was experiencing some episodes of lightheadedness.  Subsequent Holter monitor showed no evidence of atrial fibrillation, and outside echocardiogram reportedly showed atrial fibrillation with otherwise normal ventricular function and left atrial enlargement was noted.  He returns today for routine follow-up.    Since I saw him last, he has had no recurrent palpitations or lightheadedness.  He has felt well, exercising on his stationary bicycle 2 or 3 times a day for total of 30 minutes.  His activity tolerance is stable, and he is recovering well from his first total knee surgery preparing for the next 1.    His appetite is stable.  Weight is up 3 pounds from a year ago.  He has had no chest pains.  He has tolerated Eliquis with minimal additional bruising.  ______________________________________________________________________    Problem List:  Patient Active Problem List   Diagnosis     Primary osteoarthritis of one knee, right    Primary hypertension    Gastroesophageal reflux disease without esophagitis    Status post knee surgery     Medical History:  Past Medical History:   Diagnosis Date    Anemia     Arrhythmia     Arthritis     BPH (benign prostatic hyperplasia)     Celiac disease     Gastroesophageal reflux disease     Hyperlipidemia     Hypertension     Paroxysmal atrial fibrillation (H)     Sinus bradycardia      Surgical History:  Past Surgical History:   Procedure Laterality Date    ARTHROPLASTY KNEE Right 9/5/2023    Procedure: RIGHT TOTAL KNEE ARTHROPLASTY;  Surgeon: Julius Haney MD;  Location: [facility] Main OR     Social History:  Social History     Socioeconomic History    Marital status:      Spouse name: Not on file    Number of children: Not on file    Years of education: Not on file    Highest education level: Not on file   Occupational History    Not on file   Tobacco Use    Smoking status: Former     Types: Cigarettes     Passive exposure: Past    Smokeless tobacco: Never   Vaping Use    Vaping Use: Never used   Substance and Sexual Activity    Alcohol use: Not Currently    Drug use: Never    Sexual activity: Not on file   Other Topics Concern    Not on file   Social History Narrative    Not on file     Social Determinants of Health     Financial Resource Strain: Not on file   Food Insecurity: Not on file   Transportation Needs: Not on file   Physical Activity: Not on file   Stress: Not on file   Social Connections: Not on file   Interpersonal Safety: Not on file   Housing Stability: Not on file     Sleep History:  Restorative supine with no snoring or apnea noted.  Takes a Tylenol PM before bedtime.  Exercise History:  Rides stationary bicycle 2-3 times a day for total of 30 minutes with stable activity tolerance    Review of Systems:      12 point review of systems otherwise negative     Please refer above for cardiac ROS details.         Family History:  No  family history on file.      Allergies:  Allergies   Allergen Reactions    Gluten Meal Other (See Comments)     Celiac's Dx 2023     Medications:  Current Outpatient Medications   Medication Sig Dispense Refill    acetaminophen (TYLENOL) 650 MG CR tablet Take 2 tablets by mouth every morning      apixaban ANTICOAGULANT (ELIQUIS) 5 MG tablet Take 5 mg by mouth 2 times daily One in the morning and one in the night  Last dose 8/31/23 before surgery      cholecalciferol (VITAMIN D3) 25 mcg (1000 units) capsule Take 2 capsules by mouth daily      diphenhydrAMINE-acetaminophen (TYLENOL PM)  MG tablet Take 1 tablet by mouth nightly as needed for sleep      famotidine (PEPCID) 40 MG tablet Take 40 mg by mouth At Bedtime      hydrochlorothiazide (HYDRODIURIL) 25 MG tablet Take 25 mg by mouth daily as needed (for edema)      lactobacillus rhamnosus, GG, (CULTURELL) capsule Take 1 capsule by mouth daily      Multiple Vitamins-Minerals (MULTIVITAL PO) Take 1 tablet by mouth daily Stopping 8/29/23 before surgery      propranolol (INDERAL) 20 MG tablet Take 1 tablet by mouth 2 times daily      tamsulosin (FLOMAX) 0.4 MG capsule Take 0.4 mg by mouth daily      zinc gluconate 50 MG tablet Take 50 mg by mouth daily         Objective:   Wt Readings from Last 3 Encounters:   02/01/24 112 kg (247 lb)   09/05/23 108.8 kg (239 lb 14.4 oz)   10/24/22 110.7 kg (244 lb)     Vital signs:  /60 (BP Location: Right arm, Patient Position: Sitting, Cuff Size: Adult Large)   Pulse 61   Resp 18   Wt 112 kg (247 lb)   SpO2 95%   BMI 37.56 kg/m        Physical Exam:    General Appearance : Awake, Alert, No acute distress  HEENT: No Scleral icterus; the mucous membranes were pink and moist.  Conjunctivae not injected  Neck: Stout.  No cervical bruits, jugular venous distention, or thyromegaly   Chest: The spine was straight. Chest wall symmetric  Lungs: Respirations unlabored; the lungs are clear to auscultation.  No wheezing    Cardiovascular: Nonpalpable point of maximal impulse.  Auscultation reveals regular first and second heart sounds with no murmurs, rubs, or gallops.  Carotid, radial, and dorsalis pedal pulses are intact and symmetric.    Abdomen: Obese.  No organomegaly, masses, bruits, or tenderness. Bowels sounds are present  Extremities:  No clubbing, cyanosis.  No edema  Skin: No rash, bruising  Musculoskeletal: No tenderness.  Neurologic: Alert and oriented ×3. Speech is fluent.    Lab Results:  LIPIDS:  Lab Results   Component Value Date    CHOL 162 01/18/2024    CHOL 166 08/22/2023    CHOL 147 09/20/2022     Lab Results   Component Value Date    HDL 45 01/18/2024    HDL 41 08/22/2023    HDL 44 09/20/2022     Lab Results   Component Value Date     01/18/2024     (H) 08/22/2023    LDL 95 09/20/2022     Lab Results   Component Value Date    TRIG 84 01/18/2024    TRIG 74 08/22/2023    TRIG 41 09/20/2022 8/22/2023: Sinus bradycardia 49 bpm.  Sinus arrhythmia    Holter monitor 10/2022:  1. Essentially normal Holter monitor recording. 2. The study data contained no symptomatic recordings. There was no evidence of atrial fibrillation or flutter. 3. Rare supraventricular ectopy and no sustained atrial tachyarrhythmia. 4. Rare ventricular ectopy and no complex or sustained ventricular tachyarrhythmia. 5. No profound bradycardia or significant pauses.    Echocardiogram at [facility] 9/27/2022: Reported to show atrial fibrillation.  Normal left ventricular ejection fraction at 64%, left atrial enlargement with no significant valvular abnormalities.       DARÍO EDGAR MD [facility]  [phone]    This note created using Dragon voice recognition software.  Sound alike errors may have escaped editing.

## 2024-02-01 NOTE — PATIENT INSTRUCTIONS
No changes in your medical regimen.  Continue your exercise program.  There is no substitute!.  I would be happy to see you again in the future on an as-needed basis.

## 2024-03-02 ENCOUNTER — HEALTH MAINTENANCE LETTER (OUTPATIENT)
Age: 69
End: 2024-03-02

## 2024-03-19 ENCOUNTER — TRANSFERRED RECORDS (OUTPATIENT)
Dept: HEALTH INFORMATION MANAGEMENT | Facility: CLINIC | Age: 69
End: 2024-03-19

## 2024-03-19 ENCOUNTER — LAB REQUISITION (OUTPATIENT)
Dept: LAB | Facility: CLINIC | Age: 69
End: 2024-03-19
Payer: COMMERCIAL

## 2024-03-19 DIAGNOSIS — Z01.818 ENCOUNTER FOR OTHER PREPROCEDURAL EXAMINATION: ICD-10-CM

## 2024-03-19 DIAGNOSIS — I10 ESSENTIAL (PRIMARY) HYPERTENSION: ICD-10-CM

## 2024-03-19 LAB
ERYTHROCYTE [DISTWIDTH] IN BLOOD BY AUTOMATED COUNT: 13.5 % (ref 10–15)
HCT VFR BLD AUTO: 40.2 % (ref 40–53)
HGB BLD-MCNC: 13.3 G/DL (ref 13.3–17.7)
MCH RBC QN AUTO: 31.5 PG (ref 26.5–33)
MCHC RBC AUTO-ENTMCNC: 33.1 G/DL (ref 31.5–36.5)
MCV RBC AUTO: 95 FL (ref 78–100)
PLATELET # BLD AUTO: 283 10E3/UL (ref 150–450)
RBC # BLD AUTO: 4.22 10E6/UL (ref 4.4–5.9)
WBC # BLD AUTO: 8.1 10E3/UL (ref 4–11)

## 2024-03-19 PROCEDURE — 85027 COMPLETE CBC AUTOMATED: CPT | Mod: ORL | Performed by: NURSE PRACTITIONER

## 2024-03-19 PROCEDURE — 80048 BASIC METABOLIC PNL TOTAL CA: CPT | Mod: ORL | Performed by: NURSE PRACTITIONER

## 2024-03-20 LAB
ANION GAP SERPL CALCULATED.3IONS-SCNC: 11 MMOL/L (ref 7–15)
BUN SERPL-MCNC: 11.8 MG/DL (ref 8–23)
CALCIUM SERPL-MCNC: 8.6 MG/DL (ref 8.8–10.2)
CHLORIDE SERPL-SCNC: 105 MMOL/L (ref 98–107)
CREAT SERPL-MCNC: 0.74 MG/DL (ref 0.67–1.17)
DEPRECATED HCO3 PLAS-SCNC: 24 MMOL/L (ref 22–29)
EGFRCR SERPLBLD CKD-EPI 2021: >90 ML/MIN/1.73M2
GLUCOSE SERPL-MCNC: 109 MG/DL (ref 70–99)
POTASSIUM SERPL-SCNC: 4.3 MMOL/L (ref 3.4–5.3)
SODIUM SERPL-SCNC: 140 MMOL/L (ref 135–145)

## 2024-03-26 RX ORDER — FLUTICASONE PROPIONATE 50 MCG
1 SPRAY, SUSPENSION (ML) NASAL DAILY
COMMUNITY
End: 2024-03-26

## 2024-03-26 RX ORDER — SENNOSIDES 8.6 MG
650-1300 CAPSULE ORAL EVERY 8 HOURS PRN
COMMUNITY

## 2024-03-26 NOTE — PROGRESS NOTES
Planning to discharge home on POD 1 in the morning with his son staying with him to help after surgery.       03/26/24 1042   Discharge Planning   Patient/Family Anticipates Transition to home with family  (Outpatient PT arranged at [facility])   Concerns to be Addressed all concerns addressed in this encounter   Living Arrangements   People in Home alone   Type of Residence Private Residence   Is your private residence a single family home or apartment? Single family home   Number of Stairs, Within Home, Primary none  (2 exterior steps with a ramp)   Once home, are you able to live on one level? Yes   Which level? Main Level   Bathroom Shower/Tub Tub/Shower unit   Equipment Currently Used at Home dressing device;grab bar, toilet;grab bar, tub/shower;raised toilet seat;shower chair;tub bench  (Has canes and walkers at home)   Support System   Support Systems Children  (Son, Baldomero, will stay with him for a week after surgery)   Do you have someone available to stay with you one or two nights once you are home? Yes   Education   Patient attended total joint pre-op class/received pre-op teaching  email/phone call

## 2024-03-29 NOTE — TREATMENT PLAN
Orthopedic Surgery Pre-Op Plan: Josue Crook  pre-op review. This is NOT an H&P   Surgeon: Dr. Haney    [facility]: [facility]  Name of Surgery: Left Total Knee Arthroplasty   Date of Surgery: 4/2/24  H&P: Completed on 3/19/24 by Inge Alfaro CNP at [facility].   History of ASA, NSAIDS, vitamin and/or herbal supplements, GLP-1 Agonist or SGLT Inhibitor medication taken within 10 days?: Yes-multivitamins-patient instructed to hold these for 7 days before surgery.  History of blood thinners?: Yes-on chronic anticoagulation with apixaban (Eliquis) for atrial fibrillation.  Patient was instructed to hold Eliquis 4 days before surgery (take last dose on 3/28/2024, then hold).    Plan:   1) Discharge Plan: Home morning of POD 1 with assist of son, Baldomero, who will be staying with him after surgery. Please see Discharge Planning section near bottom of this note for further details.     2) Paroxsymal Atrial Fibrillation: Reviewed recent visit note with Dr. Feng, [facility], from 2/1/24: doing well, denies any palpitations or lightheadedness. Good activity tolerance, exercises on stationary bicycle 2-3 times/day for 30 minutes. Had 1 reported atrial fibrillation episode during a past echocardiogram. No documented recurrence of atrial fibrillation since then. Holter monitor 10/2022 did not show evidence of atrial fibrillation. On chronic anticoagulation with Eliquis.  Patient was instructed to hold Eliquis 4 days before surgery (take last dose on 3/28/2024, then hold).  After surgery, we will plan to resume Eliquis as soon as safe from a bleeding standpoint per Dr. Haney's team.     3) Hypertension: Appears well-controlled on propranolol and hydrochlorothiazide.  Patient was instructed to hold hydrochlorothiazide on the morning of surgery to continue taking propranolol.    4) Hyperlipidemia: Not on statin medication.    5) Obesity: BMI 38.7, Wt: 253 lbs at preop exam.  I recommend continued  efforts at safe weight loss following recovery from surgery. If patient is interested in further assistance with weight loss, please consider referral to [facility]. They offer both non-surgical and surgical evidence-based weight loss strategies. Call [phone] to schedule a consultation to learn more.      6) Celiac Disease: diagnosed in 2023. Will need to maintain gluten free diet.     7) Hepatic Steatosis:  Follows with [facility]. PCP discussed importance of diet and weight loss.     8) GERD: On famotidine.    9) Benign Prostatic Hyperplasia (BPH): On tamsulosin.  Patient is at increased risk for postop urinary retention.  Recommend continuing on tamsulosin perioperatively and close monitoring for urine retention with frequent bladder scanning as needed.     Patient appears medically optimized for upcoming surgery. I would recommend Hospitalist Consult to assist with medical management. Please call me below with any questions on this patient.       Review of Systems Notable for: Paroxysmal atrial fibrillation-on chronic anticoagulation with Eliquis, hypertension, hyperlipidemia, obesity, celiac disease, hepatic steatosis, GERD, benign prostatic hyperplasia.    Past Medical History:   Past Medical History:   Diagnosis Date    Anemia     Arrhythmia     Arthritis     Atherosclerosis of aorta (H24)     BPH (benign prostatic hyperplasia)     Celiac disease     Gastroesophageal reflux disease     Hepatic steatosis     Hyperlipidemia     Hypertension     Obese     Paroxysmal atrial fibrillation (H)     Sinus bradycardia      Past Surgical History:   Procedure Laterality Date    ARTHROPLASTY KNEE Right 09/05/2023    Procedure: RIGHT TOTAL KNEE ARTHROPLASTY;  Surgeon: Julius Haney MD;  Location: [facility] Main OR    CATARACT EXTRACTION Bilateral     right 2013, left 2015    FOOT SURGERY Left     1984    SINUS SURGERY  01/2023    TONSILLECTOMY  1960       Current  Medications:  Patient's Medications   New Prescriptions    No medications on file   Previous Medications    ACETAMINOPHEN (TYLENOL 8 HOUR ARTHRITIS PAIN) 650 MG CR TABLET    Take 650-1,300 mg by mouth every 8 hours as needed for pain    APIXABAN ANTICOAGULANT (ELIQUIS) 5 MG TABLET    Take 5 mg by mouth 2 times daily Last dose 3/28/24 before surgery    ARTIFICIAL SALIVA (BIOTENE MT) AERS SPRAY    Take 1 spray by mouth every 6 hours as needed for dry mouth    CHOLECALCIFEROL (VITAMIN D3) 25 MCG (1000 UNITS) CAPSULE    Take 2 capsules by mouth daily Stopped 3/26/24 before surgery    DIPHENHYDRAMINE-ACETAMINOPHEN (TYLENOL PM)  MG TABLET    Take 1 tablet by mouth nightly as needed for sleep    FAMOTIDINE (PEPCID) 40 MG TABLET    Take 40 mg by mouth At Bedtime    HYDROCHLOROTHIAZIDE (HYDRODIURIL) 25 MG TABLET    Take 25 mg by mouth daily as needed (for edema)    LACTOBACILLUS RHAMNOSUS, GG, (CULTURELL) CAPSULE    Take 1 capsule by mouth daily Stopped 3/26/24 before surgery    MULTIPLE VITAMINS-MINERALS (MULTIVITAL PO)    Take 1 tablet by mouth daily Stopped 3/26/24 before surgery    PROPRANOLOL (INDERAL) 20 MG TABLET    Take 1 tablet by mouth 2 times daily    TAMSULOSIN (FLOMAX) 0.4 MG CAPSULE    Take 0.4 mg by mouth daily    ZINC GLUCONATE 50 MG TABLET    Take 50 mg by mouth daily Stopped 3/26/24 before surgery   Modified Medications    No medications on file   Discontinued Medications    ACETAMINOPHEN (TYLENOL) 650 MG CR TABLET    Take 2 tablets by mouth every morning    FLUTICASONE (FLONASE) 50 MCG/ACT NASAL SPRAY    Spray 1 spray into both nostrils daily       ALLERGIES:  Allergies   Allergen Reactions    Gluten Meal Other (See Comments)     Celiac's Dx 2023       Social History  Social History     Tobacco Use    Smoking status: Former     Types: Cigarettes     Passive exposure: Past    Smokeless tobacco: Never   Vaping Use    Vaping Use: Never used   Substance Use Topics    Alcohol use: Not Currently    Drug  use: Never       Any Abnormal Recent Diagnostics? Yes  Blood glucose 109 on 3/19/2024: No known history of prediabetes or diabetes but it appears BG's have been mildly elevated in low 100's for the past 2 years. I could not find any recent hemoglobin A1c in records.  We will check A1c on day of surgery and monitor BG's during hospital stay.  Goal BG <180 to decrease risk for infection and postop wound healing complications.     Discharge Planning:   Planning to discharge home on POD 1 in the morning with his son staying with him to help after surgery.       03/26/24 1042   Discharge Planning   Patient/Family Anticipates Transition to home with family  (Outpatient PT arranged at [facility], [place])   Concerns to be Addressed all concerns addressed in this encounter   Living Arrangements   People in Home alone   Type of Residence Private Residence   Is your private residence a single family home or apartment? Single family home   Number of Stairs, Within Home, Primary none  (2 exterior steps with a ramp)   Once home, are you able to live on one level? Yes   Which level? Main Level   Bathroom Shower/Tub Tub/Shower unit   Equipment Currently Used at Home dressing device;grab bar, toilet;grab bar, tub/shower;raised toilet seat;shower chair;tub bench  (Has canes and walkers at home)   Support System   Support Systems Children  (Son, Baldomero, will stay with him for a week after surgery)   Do you have someone available to stay with you one or two nights once you are home? Yes       THERESA Gardner, CNP   Advanced Practice Nurse Navigator- Orthopedics  [facility]   Phone: [phone]

## 2024-04-02 ENCOUNTER — HOSPITAL ENCOUNTER (OUTPATIENT)
Facility: CLINIC | Age: 69
Discharge: HOME | End: 2024-04-03
Attending: ORTHOPAEDIC SURGERY | Admitting: ORTHOPAEDIC SURGERY
Payer: COMMERCIAL

## 2024-04-02 ENCOUNTER — ANESTHESIA (OUTPATIENT)
Dept: SURGERY | Facility: CLINIC | Age: 69
End: 2024-04-02
Payer: COMMERCIAL

## 2024-04-02 ENCOUNTER — ANESTHESIA EVENT (OUTPATIENT)
Dept: SURGERY | Facility: CLINIC | Age: 69
End: 2024-04-02
Payer: COMMERCIAL

## 2024-04-02 DIAGNOSIS — M17.12 PRIMARY OSTEOARTHRITIS OF LEFT KNEE: Primary | ICD-10-CM

## 2024-04-02 DIAGNOSIS — Z98.890 STATUS POST KNEE SURGERY: ICD-10-CM

## 2024-04-02 LAB
CREAT SERPL-MCNC: 0.82 MG/DL (ref 0.67–1.17)
EGFRCR SERPLBLD CKD-EPI 2021: >90 ML/MIN/1.73M2
HBA1C MFR BLD: 6.2 %
PLATELET # BLD AUTO: 247 10E3/UL (ref 150–450)

## 2024-04-02 PROCEDURE — 250N000011 HC RX IP 250 OP 636: Performed by: STUDENT IN AN ORGANIZED HEALTH CARE EDUCATION/TRAINING PROGRAM

## 2024-04-02 PROCEDURE — 258N000001 HC RX 258: Performed by: ORTHOPAEDIC SURGERY

## 2024-04-02 PROCEDURE — 250N000009 HC RX 250: Performed by: NURSE ANESTHETIST, CERTIFIED REGISTERED

## 2024-04-02 PROCEDURE — 36415 COLL VENOUS BLD VENIPUNCTURE: CPT | Performed by: ORTHOPAEDIC SURGERY

## 2024-04-02 PROCEDURE — 370N000017 HC ANESTHESIA TECHNICAL FEE, PER MIN: Performed by: ORTHOPAEDIC SURGERY

## 2024-04-02 PROCEDURE — 250N000009 HC RX 250: Performed by: ORTHOPAEDIC SURGERY

## 2024-04-02 PROCEDURE — 272N000001 HC OR GENERAL SUPPLY STERILE: Performed by: ORTHOPAEDIC SURGERY

## 2024-04-02 PROCEDURE — 36415 COLL VENOUS BLD VENIPUNCTURE: CPT | Performed by: NURSE PRACTITIONER

## 2024-04-02 PROCEDURE — 258N000003 HC RX IP 258 OP 636: Performed by: NURSE ANESTHETIST, CERTIFIED REGISTERED

## 2024-04-02 PROCEDURE — 99214 OFFICE O/P EST MOD 30 MIN: CPT | Performed by: INTERNAL MEDICINE

## 2024-04-02 PROCEDURE — 710N000010 HC RECOVERY PHASE 1, LEVEL 2, PER MIN: Performed by: ORTHOPAEDIC SURGERY

## 2024-04-02 PROCEDURE — 360N000077 HC SURGERY LEVEL 4, PER MIN: Performed by: ORTHOPAEDIC SURGERY

## 2024-04-02 PROCEDURE — 250N000013 HC RX MED GY IP 250 OP 250 PS 637: Performed by: INTERNAL MEDICINE

## 2024-04-02 PROCEDURE — 250N000013 HC RX MED GY IP 250 OP 250 PS 637: Performed by: STUDENT IN AN ORGANIZED HEALTH CARE EDUCATION/TRAINING PROGRAM

## 2024-04-02 PROCEDURE — 85049 AUTOMATED PLATELET COUNT: CPT | Performed by: ORTHOPAEDIC SURGERY

## 2024-04-02 PROCEDURE — C1713 ANCHOR/SCREW BN/BN,TIS/BN: HCPCS | Performed by: ORTHOPAEDIC SURGERY

## 2024-04-02 PROCEDURE — 258N000003 HC RX IP 258 OP 636: Performed by: STUDENT IN AN ORGANIZED HEALTH CARE EDUCATION/TRAINING PROGRAM

## 2024-04-02 PROCEDURE — 250N000011 HC RX IP 250 OP 636: Performed by: ORTHOPAEDIC SURGERY

## 2024-04-02 PROCEDURE — 250N000011 HC RX IP 250 OP 636: Performed by: NURSE ANESTHETIST, CERTIFIED REGISTERED

## 2024-04-02 PROCEDURE — 82565 ASSAY OF CREATININE: CPT | Performed by: ORTHOPAEDIC SURGERY

## 2024-04-02 PROCEDURE — 250N000013 HC RX MED GY IP 250 OP 250 PS 637: Performed by: ORTHOPAEDIC SURGERY

## 2024-04-02 PROCEDURE — 258N000003 HC RX IP 258 OP 636: Performed by: ORTHOPAEDIC SURGERY

## 2024-04-02 PROCEDURE — 250N000013 HC RX MED GY IP 250 OP 250 PS 637: Performed by: PHYSICIAN ASSISTANT

## 2024-04-02 PROCEDURE — 999N000141 HC STATISTIC PRE-PROCEDURE NURSING ASSESSMENT: Performed by: ORTHOPAEDIC SURGERY

## 2024-04-02 PROCEDURE — C1776 JOINT DEVICE (IMPLANTABLE): HCPCS | Performed by: ORTHOPAEDIC SURGERY

## 2024-04-02 PROCEDURE — 83036 HEMOGLOBIN GLYCOSYLATED A1C: CPT | Performed by: NURSE PRACTITIONER

## 2024-04-02 PROCEDURE — 250N000011 HC RX IP 250 OP 636: Performed by: PHYSICIAN ASSISTANT

## 2024-04-02 PROCEDURE — 250N000009 HC RX 250: Performed by: PHYSICIAN ASSISTANT

## 2024-04-02 DEVICE — GENESIS II NON-POROUS TIBIAL                                    BASEPLATE SIZE 5 LEFT
Type: IMPLANTABLE DEVICE | Site: KNEE | Status: FUNCTIONAL
Brand: GENESIS II

## 2024-04-02 DEVICE — GENESIS II RESURFACING PATELLAR 35MM
Type: IMPLANTABLE DEVICE | Site: KNEE | Status: FUNCTIONAL
Brand: GENESIS II

## 2024-04-02 DEVICE — LEGION CR HIGH FLEX XLPE SZ 5-6 15MM
Type: IMPLANTABLE DEVICE | Site: KNEE | Status: FUNCTIONAL
Brand: LEGION

## 2024-04-02 DEVICE — SIMPLEX® HV IS A FAST-SETTING ACRYLIC RESIN FOR USE IN BONE SURGERY. MIXING THE TWO SEPARATE STERILE COMPONENTS PRODUCES A DUCTILE BONE CEMENT WHICH, AFTER HARDENING, FIXES THE IMPLANT AND TRANSFERS STRESSES PRODUCED DURING MOVEMENT EVENLY TO THE BONE. SIMPLEX® HV CEMENT POWDER ALSO CONTAINS INSOLUBLE ZIRCONIUM DIOXIDE AS AN X-RAY CONTRAST MEDIUM. SIMPLEX® HV DOES NOT EMIT A SIGNAL AND DOES NOT POSE A SAFETY RISK IN A MAGNETIC RESONANCE ENVIRONMENT.
Type: IMPLANTABLE DEVICE | Site: KNEE | Status: FUNCTIONAL
Brand: SIMPLEX HV

## 2024-04-02 DEVICE — LEGION CRUCIATE RETAINING                                    NONPOROUS FEMORAL SIZE 6 LEFT
Type: IMPLANTABLE DEVICE | Site: KNEE | Status: FUNCTIONAL
Brand: LEGION

## 2024-04-02 RX ORDER — NALOXONE HYDROCHLORIDE 0.4 MG/ML
0.4 INJECTION, SOLUTION INTRAMUSCULAR; INTRAVENOUS; SUBCUTANEOUS
Status: DISCONTINUED | OUTPATIENT
Start: 2024-04-02 | End: 2024-04-02 | Stop reason: HOSPADM

## 2024-04-02 RX ORDER — SODIUM CHLORIDE, SODIUM LACTATE, POTASSIUM CHLORIDE, CALCIUM CHLORIDE 600; 310; 30; 20 MG/100ML; MG/100ML; MG/100ML; MG/100ML
INJECTION, SOLUTION INTRAVENOUS CONTINUOUS
Status: DISCONTINUED | OUTPATIENT
Start: 2024-04-02 | End: 2024-04-02 | Stop reason: HOSPADM

## 2024-04-02 RX ORDER — METHOCARBAMOL 500 MG/1
500 TABLET, FILM COATED ORAL EVERY 6 HOURS PRN
Status: DISCONTINUED | OUTPATIENT
Start: 2024-04-02 | End: 2024-04-03 | Stop reason: HOSPADM

## 2024-04-02 RX ORDER — PROPOFOL 10 MG/ML
INJECTION, EMULSION INTRAVENOUS CONTINUOUS PRN
Status: DISCONTINUED | OUTPATIENT
Start: 2024-04-02 | End: 2024-04-02

## 2024-04-02 RX ORDER — ONDANSETRON 4 MG/1
4 TABLET, ORALLY DISINTEGRATING ORAL EVERY 6 HOURS PRN
Status: DISCONTINUED | OUTPATIENT
Start: 2024-04-02 | End: 2024-04-03 | Stop reason: HOSPADM

## 2024-04-02 RX ORDER — HYDROMORPHONE HCL IN WATER/PF 6 MG/30 ML
0.2 PATIENT CONTROLLED ANALGESIA SYRINGE INTRAVENOUS
Status: DISCONTINUED | OUTPATIENT
Start: 2024-04-02 | End: 2024-04-03 | Stop reason: HOSPADM

## 2024-04-02 RX ORDER — OXYCODONE HYDROCHLORIDE 5 MG/1
5 TABLET ORAL EVERY 4 HOURS PRN
Status: DISCONTINUED | OUTPATIENT
Start: 2024-04-02 | End: 2024-04-03 | Stop reason: HOSPADM

## 2024-04-02 RX ORDER — FENTANYL CITRATE 50 UG/ML
25-50 INJECTION, SOLUTION INTRAMUSCULAR; INTRAVENOUS
Status: DISCONTINUED | OUTPATIENT
Start: 2024-04-02 | End: 2024-04-02 | Stop reason: HOSPADM

## 2024-04-02 RX ORDER — PROCHLORPERAZINE MALEATE 5 MG
5 TABLET ORAL EVERY 6 HOURS PRN
Status: DISCONTINUED | OUTPATIENT
Start: 2024-04-02 | End: 2024-04-03 | Stop reason: HOSPADM

## 2024-04-02 RX ORDER — SODIUM CHLORIDE, SODIUM LACTATE, POTASSIUM CHLORIDE, CALCIUM CHLORIDE 600; 310; 30; 20 MG/100ML; MG/100ML; MG/100ML; MG/100ML
INJECTION, SOLUTION INTRAVENOUS CONTINUOUS
Status: DISCONTINUED | OUTPATIENT
Start: 2024-04-02 | End: 2024-04-03 | Stop reason: HOSPADM

## 2024-04-02 RX ORDER — CELECOXIB 100 MG/1
100 CAPSULE ORAL 2 TIMES DAILY
Status: DISCONTINUED | OUTPATIENT
Start: 2024-04-02 | End: 2024-04-03 | Stop reason: HOSPADM

## 2024-04-02 RX ORDER — HYDROMORPHONE HCL IN WATER/PF 6 MG/30 ML
0.4 PATIENT CONTROLLED ANALGESIA SYRINGE INTRAVENOUS EVERY 5 MIN PRN
Status: DISCONTINUED | OUTPATIENT
Start: 2024-04-02 | End: 2024-04-02

## 2024-04-02 RX ORDER — ONDANSETRON 4 MG/1
4 TABLET, ORALLY DISINTEGRATING ORAL EVERY 30 MIN PRN
Status: CANCELLED | OUTPATIENT
Start: 2024-04-02

## 2024-04-02 RX ORDER — GLYCOPYRROLATE 0.2 MG/ML
INJECTION, SOLUTION INTRAMUSCULAR; INTRAVENOUS PRN
Status: DISCONTINUED | OUTPATIENT
Start: 2024-04-02 | End: 2024-04-02

## 2024-04-02 RX ORDER — PROPRANOLOL HYDROCHLORIDE 20 MG/1
20 TABLET ORAL 2 TIMES DAILY
Status: DISCONTINUED | OUTPATIENT
Start: 2024-04-02 | End: 2024-04-03 | Stop reason: HOSPADM

## 2024-04-02 RX ORDER — OXYCODONE HYDROCHLORIDE 5 MG/1
10 TABLET ORAL EVERY 4 HOURS PRN
Status: DISCONTINUED | OUTPATIENT
Start: 2024-04-02 | End: 2024-04-03 | Stop reason: HOSPADM

## 2024-04-02 RX ORDER — ONDANSETRON 2 MG/ML
INJECTION INTRAMUSCULAR; INTRAVENOUS PRN
Status: DISCONTINUED | OUTPATIENT
Start: 2024-04-02 | End: 2024-04-02

## 2024-04-02 RX ORDER — FAMOTIDINE 20 MG/1
40 TABLET, FILM COATED ORAL AT BEDTIME
Status: DISCONTINUED | OUTPATIENT
Start: 2024-04-02 | End: 2024-04-03 | Stop reason: HOSPADM

## 2024-04-02 RX ORDER — PROPOFOL 10 MG/ML
INJECTION, EMULSION INTRAVENOUS PRN
Status: DISCONTINUED | OUTPATIENT
Start: 2024-04-02 | End: 2024-04-02

## 2024-04-02 RX ORDER — NALOXONE HYDROCHLORIDE 0.4 MG/ML
0.4 INJECTION, SOLUTION INTRAMUSCULAR; INTRAVENOUS; SUBCUTANEOUS
Status: DISCONTINUED | OUTPATIENT
Start: 2024-04-02 | End: 2024-04-03 | Stop reason: HOSPADM

## 2024-04-02 RX ORDER — HYDROXYZINE HYDROCHLORIDE 10 MG/1
10 TABLET, FILM COATED ORAL EVERY 6 HOURS PRN
Status: DISCONTINUED | OUTPATIENT
Start: 2024-04-02 | End: 2024-04-03 | Stop reason: HOSPADM

## 2024-04-02 RX ORDER — ACETAMINOPHEN 325 MG/1
975 TABLET ORAL EVERY 8 HOURS
Qty: 27 TABLET | Refills: 0 | Status: DISCONTINUED | OUTPATIENT
Start: 2024-04-02 | End: 2024-04-03 | Stop reason: HOSPADM

## 2024-04-02 RX ORDER — OXYCODONE HYDROCHLORIDE 5 MG/1
5 TABLET ORAL
Status: CANCELLED | OUTPATIENT
Start: 2024-04-02

## 2024-04-02 RX ORDER — NALOXONE HYDROCHLORIDE 0.4 MG/ML
0.1 INJECTION, SOLUTION INTRAMUSCULAR; INTRAVENOUS; SUBCUTANEOUS
Status: CANCELLED | OUTPATIENT
Start: 2024-04-02

## 2024-04-02 RX ORDER — HYDROCHLOROTHIAZIDE 25 MG/1
25 TABLET ORAL DAILY PRN
Status: DISCONTINUED | OUTPATIENT
Start: 2024-04-02 | End: 2024-04-03 | Stop reason: HOSPADM

## 2024-04-02 RX ORDER — ONDANSETRON 2 MG/ML
4 INJECTION INTRAMUSCULAR; INTRAVENOUS EVERY 30 MIN PRN
Status: DISCONTINUED | OUTPATIENT
Start: 2024-04-02 | End: 2024-04-02 | Stop reason: HOSPADM

## 2024-04-02 RX ORDER — CEFAZOLIN SODIUM/WATER 2 G/20 ML
2 SYRINGE (ML) INTRAVENOUS SEE ADMIN INSTRUCTIONS
Status: DISCONTINUED | OUTPATIENT
Start: 2024-04-02 | End: 2024-04-02 | Stop reason: HOSPADM

## 2024-04-02 RX ORDER — TRANEXAMIC ACID 650 MG/1
1950 TABLET ORAL ONCE
Status: COMPLETED | OUTPATIENT
Start: 2024-04-02 | End: 2024-04-02

## 2024-04-02 RX ORDER — DEXAMETHASONE SODIUM PHOSPHATE 10 MG/ML
INJECTION, SOLUTION INTRAMUSCULAR; INTRAVENOUS PRN
Status: DISCONTINUED | OUTPATIENT
Start: 2024-04-02 | End: 2024-04-02

## 2024-04-02 RX ORDER — POLYETHYLENE GLYCOL 3350 17 G/17G
17 POWDER, FOR SOLUTION ORAL DAILY
Status: DISCONTINUED | OUTPATIENT
Start: 2024-04-03 | End: 2024-04-03 | Stop reason: HOSPADM

## 2024-04-02 RX ORDER — AMOXICILLIN 250 MG
1 CAPSULE ORAL 2 TIMES DAILY
Status: DISCONTINUED | OUTPATIENT
Start: 2024-04-02 | End: 2024-04-03 | Stop reason: HOSPADM

## 2024-04-02 RX ORDER — LIDOCAINE HYDROCHLORIDE 10 MG/ML
INJECTION, SOLUTION INFILTRATION; PERINEURAL PRN
Status: DISCONTINUED | OUTPATIENT
Start: 2024-04-02 | End: 2024-04-02

## 2024-04-02 RX ORDER — NALOXONE HYDROCHLORIDE 0.4 MG/ML
0.1 INJECTION, SOLUTION INTRAMUSCULAR; INTRAVENOUS; SUBCUTANEOUS
Status: DISCONTINUED | OUTPATIENT
Start: 2024-04-02 | End: 2024-04-02

## 2024-04-02 RX ORDER — ONDANSETRON 2 MG/ML
4 INJECTION INTRAMUSCULAR; INTRAVENOUS EVERY 30 MIN PRN
Status: CANCELLED | OUTPATIENT
Start: 2024-04-02

## 2024-04-02 RX ORDER — BUPIVACAINE HYDROCHLORIDE 5 MG/ML
INJECTION, SOLUTION EPIDURAL; INTRACAUDAL
Status: COMPLETED | OUTPATIENT
Start: 2024-04-02 | End: 2024-04-02

## 2024-04-02 RX ORDER — FLUMAZENIL 0.1 MG/ML
0.2 INJECTION, SOLUTION INTRAVENOUS
Status: DISCONTINUED | OUTPATIENT
Start: 2024-04-02 | End: 2024-04-02 | Stop reason: HOSPADM

## 2024-04-02 RX ORDER — NALOXONE HYDROCHLORIDE 0.4 MG/ML
0.2 INJECTION, SOLUTION INTRAMUSCULAR; INTRAVENOUS; SUBCUTANEOUS
Status: DISCONTINUED | OUTPATIENT
Start: 2024-04-02 | End: 2024-04-03 | Stop reason: HOSPADM

## 2024-04-02 RX ORDER — FENTANYL CITRATE 50 UG/ML
50 INJECTION, SOLUTION INTRAMUSCULAR; INTRAVENOUS EVERY 5 MIN PRN
Status: DISCONTINUED | OUTPATIENT
Start: 2024-04-02 | End: 2024-04-02

## 2024-04-02 RX ORDER — ACETAMINOPHEN 325 MG/1
650 TABLET ORAL EVERY 4 HOURS PRN
Status: DISCONTINUED | OUTPATIENT
Start: 2024-04-05 | End: 2024-04-03 | Stop reason: HOSPADM

## 2024-04-02 RX ORDER — ACETAMINOPHEN 325 MG/1
975 TABLET ORAL ONCE
Status: COMPLETED | OUTPATIENT
Start: 2024-04-02 | End: 2024-04-02

## 2024-04-02 RX ORDER — ONDANSETRON 4 MG/1
4 TABLET, ORALLY DISINTEGRATING ORAL EVERY 30 MIN PRN
Status: DISCONTINUED | OUTPATIENT
Start: 2024-04-02 | End: 2024-04-02 | Stop reason: HOSPADM

## 2024-04-02 RX ORDER — LABETALOL HYDROCHLORIDE 5 MG/ML
10 INJECTION, SOLUTION INTRAVENOUS
Status: DISCONTINUED | OUTPATIENT
Start: 2024-04-02 | End: 2024-04-02 | Stop reason: HOSPADM

## 2024-04-02 RX ORDER — HYDROXYZINE HYDROCHLORIDE 10 MG/1
10 TABLET, FILM COATED ORAL EVERY 6 HOURS PRN
Qty: 25 TABLET | Refills: 0 | Status: SHIPPED | OUTPATIENT
Start: 2024-04-02

## 2024-04-02 RX ORDER — FENTANYL CITRATE 50 UG/ML
25 INJECTION, SOLUTION INTRAMUSCULAR; INTRAVENOUS EVERY 5 MIN PRN
Status: DISCONTINUED | OUTPATIENT
Start: 2024-04-02 | End: 2024-04-02

## 2024-04-02 RX ORDER — BISACODYL 10 MG
10 SUPPOSITORY, RECTAL RECTAL DAILY PRN
Status: DISCONTINUED | OUTPATIENT
Start: 2024-04-02 | End: 2024-04-03 | Stop reason: HOSPADM

## 2024-04-02 RX ORDER — HYDROMORPHONE HCL IN WATER/PF 6 MG/30 ML
0.4 PATIENT CONTROLLED ANALGESIA SYRINGE INTRAVENOUS
Status: DISCONTINUED | OUTPATIENT
Start: 2024-04-02 | End: 2024-04-03 | Stop reason: HOSPADM

## 2024-04-02 RX ORDER — ONDANSETRON 2 MG/ML
4 INJECTION INTRAMUSCULAR; INTRAVENOUS EVERY 6 HOURS PRN
Status: DISCONTINUED | OUTPATIENT
Start: 2024-04-02 | End: 2024-04-03 | Stop reason: HOSPADM

## 2024-04-02 RX ORDER — SALIVA STIMULANT COMB. NO.3
1 SPRAY, NON-AEROSOL (ML) MUCOUS MEMBRANE EVERY 6 HOURS PRN
Status: DISCONTINUED | OUTPATIENT
Start: 2024-04-02 | End: 2024-04-03 | Stop reason: HOSPADM

## 2024-04-02 RX ORDER — MAGNESIUM HYDROXIDE 1200 MG/15ML
LIQUID ORAL PRN
Status: DISCONTINUED | OUTPATIENT
Start: 2024-04-02 | End: 2024-04-02 | Stop reason: HOSPADM

## 2024-04-02 RX ORDER — CEFAZOLIN SODIUM/WATER 2 G/20 ML
2 SYRINGE (ML) INTRAVENOUS
Status: COMPLETED | OUTPATIENT
Start: 2024-04-02 | End: 2024-04-02

## 2024-04-02 RX ORDER — NALOXONE HYDROCHLORIDE 0.4 MG/ML
0.2 INJECTION, SOLUTION INTRAMUSCULAR; INTRAVENOUS; SUBCUTANEOUS
Status: DISCONTINUED | OUTPATIENT
Start: 2024-04-02 | End: 2024-04-02 | Stop reason: HOSPADM

## 2024-04-02 RX ORDER — DIPHENHYDRAMINE HCL 12.5 MG/5ML
12.5 SOLUTION ORAL EVERY 6 HOURS PRN
Status: DISCONTINUED | OUTPATIENT
Start: 2024-04-02 | End: 2024-04-03 | Stop reason: HOSPADM

## 2024-04-02 RX ORDER — TAMSULOSIN HYDROCHLORIDE 0.4 MG/1
0.4 CAPSULE ORAL EVERY EVENING
Status: DISCONTINUED | OUTPATIENT
Start: 2024-04-02 | End: 2024-04-03 | Stop reason: HOSPADM

## 2024-04-02 RX ORDER — HYDROMORPHONE HCL IN WATER/PF 6 MG/30 ML
0.2 PATIENT CONTROLLED ANALGESIA SYRINGE INTRAVENOUS EVERY 5 MIN PRN
Status: DISCONTINUED | OUTPATIENT
Start: 2024-04-02 | End: 2024-04-02

## 2024-04-02 RX ORDER — CEFAZOLIN SODIUM 2 G/100ML
2 INJECTION, SOLUTION INTRAVENOUS EVERY 8 HOURS
Qty: 200 ML | Refills: 0 | Status: COMPLETED | OUTPATIENT
Start: 2024-04-02 | End: 2024-04-03

## 2024-04-02 RX ORDER — LIDOCAINE 40 MG/G
CREAM TOPICAL
Status: DISCONTINUED | OUTPATIENT
Start: 2024-04-02 | End: 2024-04-03 | Stop reason: HOSPADM

## 2024-04-02 RX ORDER — ENOXAPARIN SODIUM 100 MG/ML
40 INJECTION SUBCUTANEOUS EVERY 24 HOURS
Status: DISCONTINUED | OUTPATIENT
Start: 2024-04-03 | End: 2024-04-03 | Stop reason: HOSPADM

## 2024-04-02 RX ORDER — LIDOCAINE 40 MG/G
CREAM TOPICAL
Status: DISCONTINUED | OUTPATIENT
Start: 2024-04-02 | End: 2024-04-02 | Stop reason: HOSPADM

## 2024-04-02 RX ORDER — CALCIUM CARBONATE 500 MG/1
500 TABLET, CHEWABLE ORAL 4 TIMES DAILY PRN
Status: DISCONTINUED | OUTPATIENT
Start: 2024-04-02 | End: 2024-04-03 | Stop reason: HOSPADM

## 2024-04-02 RX ADMIN — TAMSULOSIN HYDROCHLORIDE 0.4 MG: 0.4 CAPSULE ORAL at 19:19

## 2024-04-02 RX ADMIN — ONDANSETRON 4 MG: 2 INJECTION INTRAMUSCULAR; INTRAVENOUS at 12:36

## 2024-04-02 RX ADMIN — Medication 2 G: at 12:24

## 2024-04-02 RX ADMIN — ACETAMINOPHEN 975 MG: 325 TABLET ORAL at 09:36

## 2024-04-02 RX ADMIN — TRANEXAMIC ACID 1950 MG: 650 TABLET ORAL at 09:36

## 2024-04-02 RX ADMIN — PHENYLEPHRINE HYDROCHLORIDE 100 MCG: 10 INJECTION INTRAVENOUS at 12:52

## 2024-04-02 RX ADMIN — SODIUM CHLORIDE, POTASSIUM CHLORIDE, SODIUM LACTATE AND CALCIUM CHLORIDE: 600; 310; 30; 20 INJECTION, SOLUTION INTRAVENOUS at 12:50

## 2024-04-02 RX ADMIN — CELECOXIB 100 MG: 100 CAPSULE ORAL at 21:31

## 2024-04-02 RX ADMIN — PROPRANOLOL HYDROCHLORIDE 20 MG: 20 TABLET ORAL at 21:32

## 2024-04-02 RX ADMIN — FAMOTIDINE 40 MG: 20 TABLET, FILM COATED ORAL at 21:32

## 2024-04-02 RX ADMIN — PHENYLEPHRINE HYDROCHLORIDE 0.3 MCG/KG/MIN: 10 INJECTION INTRAVENOUS at 13:15

## 2024-04-02 RX ADMIN — MIDAZOLAM 1 MG: 1 INJECTION INTRAMUSCULAR; INTRAVENOUS at 10:38

## 2024-04-02 RX ADMIN — FENTANYL CITRATE 50 MCG: 50 INJECTION, SOLUTION INTRAMUSCULAR; INTRAVENOUS at 10:38

## 2024-04-02 RX ADMIN — DEXAMETHASONE SODIUM PHOSPHATE 10 MG: 10 INJECTION, SOLUTION INTRAMUSCULAR; INTRAVENOUS at 12:36

## 2024-04-02 RX ADMIN — OXYCODONE 10 MG: 5 TABLET ORAL at 16:31

## 2024-04-02 RX ADMIN — FENTANYL CITRATE 50 MCG: 50 INJECTION, SOLUTION INTRAMUSCULAR; INTRAVENOUS at 14:47

## 2024-04-02 RX ADMIN — PHENYLEPHRINE HYDROCHLORIDE 100 MCG: 10 INJECTION INTRAVENOUS at 13:24

## 2024-04-02 RX ADMIN — CEFAZOLIN SODIUM 2 G: 2 INJECTION, SOLUTION INTRAVENOUS at 21:31

## 2024-04-02 RX ADMIN — PROPOFOL 50 MG: 10 INJECTION, EMULSION INTRAVENOUS at 12:42

## 2024-04-02 RX ADMIN — HYDROXYZINE HYDROCHLORIDE 10 MG: 10 TABLET ORAL at 16:31

## 2024-04-02 RX ADMIN — PHENYLEPHRINE HYDROCHLORIDE 200 MCG: 10 INJECTION INTRAVENOUS at 12:45

## 2024-04-02 RX ADMIN — GLYCOPYRROLATE 0.2 MG: 0.2 INJECTION INTRAMUSCULAR; INTRAVENOUS at 12:53

## 2024-04-02 RX ADMIN — PROPOFOL 50 MG: 10 INJECTION, EMULSION INTRAVENOUS at 12:36

## 2024-04-02 RX ADMIN — PHENYLEPHRINE HYDROCHLORIDE 200 MCG: 10 INJECTION INTRAVENOUS at 13:13

## 2024-04-02 RX ADMIN — PROPOFOL 125 MCG/KG/MIN: 10 INJECTION, EMULSION INTRAVENOUS at 12:36

## 2024-04-02 RX ADMIN — SODIUM CHLORIDE, POTASSIUM CHLORIDE, SODIUM LACTATE AND CALCIUM CHLORIDE: 600; 310; 30; 20 INJECTION, SOLUTION INTRAVENOUS at 16:22

## 2024-04-02 RX ADMIN — SODIUM CHLORIDE, POTASSIUM CHLORIDE, SODIUM LACTATE AND CALCIUM CHLORIDE: 600; 310; 30; 20 INJECTION, SOLUTION INTRAVENOUS at 09:59

## 2024-04-02 RX ADMIN — BUPIVACAINE HYDROCHLORIDE 15 ML: 5 INJECTION, SOLUTION EPIDURAL; INTRACAUDAL; PERINEURAL at 10:38

## 2024-04-02 RX ADMIN — ACETAMINOPHEN 975 MG: 325 TABLET ORAL at 19:19

## 2024-04-02 RX ADMIN — LIDOCAINE HYDROCHLORIDE 5 ML: 10 INJECTION, SOLUTION INFILTRATION; PERINEURAL at 12:36

## 2024-04-02 RX ADMIN — OXYCODONE 10 MG: 5 TABLET ORAL at 23:18

## 2024-04-02 RX ADMIN — FENTANYL CITRATE 50 MCG: 50 INJECTION, SOLUTION INTRAMUSCULAR; INTRAVENOUS at 14:40

## 2024-04-02 RX ADMIN — HYDROMORPHONE HYDROCHLORIDE 0.4 MG: 0.2 INJECTION, SOLUTION INTRAMUSCULAR; INTRAVENOUS; SUBCUTANEOUS at 14:55

## 2024-04-02 RX ADMIN — SENNOSIDES AND DOCUSATE SODIUM 1 TABLET: 8.6; 5 TABLET ORAL at 21:31

## 2024-04-02 RX ADMIN — HYDROMORPHONE HYDROCHLORIDE 0.2 MG: 0.2 INJECTION, SOLUTION INTRAMUSCULAR; INTRAVENOUS; SUBCUTANEOUS at 15:05

## 2024-04-02 RX ADMIN — MEPIVACAINE HYDROCHLORIDE 3 ML: 15 INJECTION, SOLUTION EPIDURAL; INFILTRATION at 12:34

## 2024-04-02 ASSESSMENT — ACTIVITIES OF DAILY LIVING (ADL)
ADLS_ACUITY_SCORE: 23
ADLS_ACUITY_SCORE: 25
ADLS_ACUITY_SCORE: 23

## 2024-04-02 ASSESSMENT — ENCOUNTER SYMPTOMS: DYSRHYTHMIAS: 1

## 2024-04-02 NOTE — ANESTHESIA PROCEDURE NOTES
"Intrathecal injection Procedure Note    Pre-Procedure   Staff -        Anesthesiologist:  Ronn Buitrago MD       Performed By: anesthesiologist       Location: OR       Procedure Start/Stop Times: 4/2/2024 12:34 PM and 4/2/2024 12:35 PM       Pre-Anesthestic Checklist: patient identified, IV checked, risks and benefits discussed, informed consent, monitors and equipment checked, pre-op evaluation and at physician/surgeon's request  Timeout:       Correct Patient: Yes        Correct Procedure: Yes        Correct Site: Yes        Correct Position: Yes   Procedure Documentation  Procedure: intrathecal injection       Patient Position: sitting       Skin prep: Chloraprep       Insertion Site: L3-4. (midline approach).       Needle Gauge: 24.        Needle Length (Inches): 3.5        Spinal Needle Type: Pencan       Introducer used       Introducer: 20 G       # of attempts:  # of redirects:  0    Assessment/Narrative         Paresthesias: No.       CSF fluid: clear.       Opening pressure was cmH2O while  Sitting.      Medication(s) Administered   1.5% Mepivacaine PF (Intrathecal) - Intrathecal   3 mL - 4/2/2024 12:34:00 PM  Medication Administration Time: 4/2/2024 12:34 PM      FOR [facility] ([facility]) ONLY:   Pain Team Contact information: please page the Pain Team Via [employer]. Search \"Pain\". During daytime hours, please page the attending first. At night please page the resident first.      "

## 2024-04-02 NOTE — OP NOTE
Operative Note      PROCEDURE  LEFT MINIMALLY INVASIVE TOTAL KNEE ARTHROPLASTY     Pre-Procedure Diagnosis:  PRIMARY OSTEOARTHRITIS OF LEFT KNEE    Post-Procedure Diagnosis:    PRIMARY OSTEOARTHRITIS OF LEFT KNEE    Surgeon(s):  MD Boby Duffy PA-C  A PAC was necessary to ensure safety of this patient and adequate progression of the procedure.    Anesthesia Type:  Spinal    Complications:  None    Condition on discharge from OR:  Stable    Estimated Blood Loss:   50 cc    Specimens:    None       Drains:   None    Implants:  6 LEFT LEGION CR femur, 5 LEFT JUAN MANUEL II tibia, 15 mm CR HF XLPE insert, and 35 mm patella (Smith and Nephew Legion knee)    Description:  After adequate anesthesia the left knee was prepped and draped in the usual sterile fashion.  We proceeded with an MIS midline incision through the skin and subcutaneous tissue and performed a midvastus approach.  The patella was everted and 10 mm of patellar bone was resected.  The knee was flexed, we identified Whitesides line and drilled the 6 degree intramedullary alignment guide.  We made a cut onto the anterior cortex and then a distal femoral cut of 9 mm.  I then sized the knee at a 6 and made the anterior, posterior, and champfer cuts.  I turned attention to the tibia.  The extramedullary guide was utilized to alignment and the tibial plateau cut was completed without difficulty.  A laminar  was placed and the posterior elements of the medial and lateral menisci and femoral osteophytes were removed.  I then injected the capsule with ropivicaine, morphine, toradol, and epi.  I trialed a 6 femur, a 5 tibia, and an 15 mm CR flex insert.  Alignment and soft tissue balance felt and looked excellent, so we marked and punched the components.  We then used pulsavac lavage to wash the bony surfaces, we dried the bony surfaces and then cemented the tibia, the femur, and the patella in that order.  With the components cemented in  place and all excess cement removed, we let the cement cure.  I put the tourniquet down and gained hemostasis.  Once the cement was cured, I locked the 15 mm CR HF XLPE insert and irrigated again.  We closed the arthrotomy with #1 ethibond and the VMO fascia with #1 vicryl.  The subQ was closed with 2.0 vicryl and the skin with staples.  Sterile bandages were applied and the patient was returned to the PACU in excellent condition.    Plan:  WBAT   F/U in 2 weeks   Leave Aquacel for 10 days   DVT Prophylaxis: Lovenox on Wed, and resume usual Eliquis on Thursday

## 2024-04-02 NOTE — PLAN OF CARE
Patient vital signs are at baseline: Yes  Patient able to ambulate as they were prior to admission or with assist devices provided by therapies during their stay:  Yes  Patient MUST void prior to discharge:  Voiding some but urine retention noted: voided 300 ml then PVR was 587 ml. Was straight cathd for 600 ml out.  Patient able to tolerate oral intake:  Yes  Pain has adequate pain control using Oral analgesics:  Yes  Does patient have an identified :  Yes  Has goal D/C date and time been discussed with patient:  Yes

## 2024-04-02 NOTE — ANESTHESIA PROCEDURE NOTES
"Adductor canal Procedure Note    Pre-Procedure   Staff -        Anesthesiologist:  Ronn Buitrago MD       Performed By: anesthesiologist       Location: pre-op       Procedure Start/Stop Times: 4/2/2024 10:38 AM and 4/2/2024 10:42 AM       Pre-Anesthestic Checklist: patient identified, IV checked, site marked, risks and benefits discussed, informed consent, monitors and equipment checked, pre-op evaluation, at physician/surgeon's request and post-op pain management  Timeout:       Correct Patient: Yes        Correct Procedure: Yes        Correct Site: Yes        Correct Position: Yes        Correct Laterality: Yes        Site Marked: Yes  Procedure Documentation  Procedure: Adductor canal       Diagnosis: POST OP PAIN CONTROL       Laterality: left       Patient Position: supine       Skin prep: Chloraprep       Needle Type: short bevel       Needle Gauge: 21.        Needle Length (millimeters): 110        Ultrasound guided       1. Ultrasound was used to identify targeted nerve, plexus, vascular marker, or fascial plane and place a needle adjacent to it in real-time.       2. Ultrasound was used to visualize the spread of anesthetic in close proximity to the above referenced structure.       3. A permanent image is entered into the patient's record.    Assessment/Narrative         The placement was negative for: blood aspirated, painful injection and site bleeding       Paresthesias: No.       Bolus given via needle..        Secured via.        Insertion/Infusion Method: Single Shot       Complications: none    Medication(s) Administered   Bupivacaine 0.5% PF (Infiltration) - Infiltration   15 mL - 4/2/2024 10:38:00 AM  Medication Administration Time: 4/2/2024 10:38 AM      FOR [facility] ([facility]) ONLY:   Pain Team Contact information: please page the Pain Team Via [employer]. Search \"Pain\". During daytime hours, please page the attending first. At night please page the resident first.      "

## 2024-04-02 NOTE — ANESTHESIA PREPROCEDURE EVALUATION
Anesthesia Pre-Procedure Evaluation    Patient: Josue Crook   MRN: [id number] : 1955        Procedure : Procedure(s):  LEFT TOTAL KNEE ARTHROPLASTY          Past Medical History:   Diagnosis Date    Anemia     Arrhythmia     Arthritis     Atherosclerosis of aorta (H24)     BPH (benign prostatic hyperplasia)     Celiac disease     Gastroesophageal reflux disease     Hepatic steatosis     Hyperlipidemia     Hypertension     Obese     Paroxysmal atrial fibrillation (H)     Sinus bradycardia       Past Surgical History:   Procedure Laterality Date    ARTHROPLASTY KNEE Right 2023    Procedure: RIGHT TOTAL KNEE ARTHROPLASTY;  Surgeon: Julius Haney MD;  Location: [facility] Main OR    CATARACT EXTRACTION Bilateral     right , left     FOOT SURGERY Left         SINUS SURGERY  2023    TONSILLECTOMY  1960      Allergies   Allergen Reactions    Gluten Meal Other (See Comments)     Celiac's Dx       Social History     Tobacco Use    Smoking status: Former     Types: Cigarettes     Passive exposure: Past    Smokeless tobacco: Never   Substance Use Topics    Alcohol use: Not Currently      Wt Readings from Last 1 Encounters:   24 114.8 kg (253 lb)        Anesthesia Evaluation            ROS/MED HX  ENT/Pulmonary:       Neurologic:       Cardiovascular:     (+) Dyslipidemia hypertension- -   -  - -                        dysrhythmias, a-fib,          (-) murmur   METS/Exercise Tolerance:     Hematologic:       Musculoskeletal:       GI/Hepatic: Comment: Celiac disease    (+) GERD,            liver disease (Steatosis),       Renal/Genitourinary:       Endo:     (+)               Obesity,       Psychiatric/Substance Use:       Infectious Disease:       Malignancy:       Other:            Physical Exam    Airway  airway exam normal      Mallampati: II   TM distance: > 3 FB   Neck ROM: full   Mouth opening: > 3 cm    Respiratory Devices and Support         Dental       (+) Minor  "Abnormalities - some fillings, tiny chips      Cardiovascular   cardiovascular exam normal       Rhythm and rate: regular and normal (-) no murmur    Pulmonary   pulmonary exam normal        breath sounds clear to auscultation           OUTSIDE LABS:  CBC:   Lab Results   Component Value Date    WBC 8.1 03/19/2024    WBC 7.7 08/22/2023    HGB 13.3 03/19/2024    HGB 12.6 (L) 09/06/2023    HCT 40.2 03/19/2024    HCT 42.5 08/22/2023     03/19/2024     08/22/2023     BMP:   Lab Results   Component Value Date     03/19/2024     01/18/2024    POTASSIUM 4.3 03/19/2024    POTASSIUM 4.4 01/18/2024    CHLORIDE 105 03/19/2024    CHLORIDE 101 01/18/2024    CO2 24 03/19/2024    CO2 23 01/18/2024    BUN 11.8 03/19/2024    BUN 10.2 01/18/2024    CR 0.74 03/19/2024    CR 0.83 01/18/2024     (H) 03/19/2024     (H) 01/18/2024     COAGS: No results found for: \"PTT\", \"INR\", \"FIBR\"  POC: No results found for: \"BGM\", \"HCG\", \"HCGS\"  HEPATIC:   Lab Results   Component Value Date    ALBUMIN 3.7 01/18/2024    PROTTOTAL 6.5 01/18/2024    ALT 21 01/18/2024    AST 20 01/18/2024    GGT 11 10/11/2022    ALKPHOS 105 01/18/2024    BILITOTAL 0.3 01/18/2024     OTHER:   Lab Results   Component Value Date    YAMIL 8.6 (L) 03/19/2024    MAG 1.8 03/19/2019    LIPASE 25 09/10/2020       Anesthesia Plan    ASA Status:  3    NPO Status:  NPO Appropriate    Anesthesia Type: Spinal.              Consents    Anesthesia Plan(s) and associated risks, benefits, and realistic alternatives discussed. Questions answered and patient/representative(s) expressed understanding.     - Discussed: Risks, Benefits and Alternatives for BOTH SEDATION and the PROCEDURE were discussed     - Discussed with:  Patient            Postoperative Care    Pain management: IV analgesics, Oral pain medications, Peripheral nerve block (Single Shot).   PONV prophylaxis: Ondansetron (or other 5HT-3), Dexamethasone or Solumedrol     Comments:    Other " "Comments: Risks of Spinal and MAC anesthesia including but not limited to headache, recall, awareness, and potential conversion to general anesthesia discussed.           Ronn Buitrago MD    I have reviewed the pertinent notes and labs in the chart from the past 30 days and (re)examined the patient.  Any updates or changes from those notes are reflected in this note.            # Drug Induced Coagulation Defect: home medication list includes an anticoagulant medication   # Obesity: Estimated body mass index is 38.75 kg/m  as calculated from the following:    Height as of this encounter: 1.721 m (5' 7.75\").    Weight as of this encounter: 114.8 kg (253 lb).      "

## 2024-04-02 NOTE — ANESTHESIA POSTPROCEDURE EVALUATION
Patient: Josue Crook    Procedure: Procedure(s):  LEFT TOTAL KNEE ARTHROPLASTY       Anesthesia Type:  Spinal    Note:  Disposition: Inpatient   Postop Pain Control: Uneventful            Sign Out: Well controlled pain   PONV: No   Neuro/Psych: Uneventful            Sign Out: Acceptable/Baseline neuro status   Airway/Respiratory: Uneventful            Sign Out: Acceptable/Baseline resp. status   CV/Hemodynamics: Uneventful            Sign Out: Acceptable CV status; No obvious hypovolemia; No obvious fluid overload   Other NRE: NONE   DID A NON-ROUTINE EVENT OCCUR? No           Last vitals:  Vitals Value Taken Time   /69 04/02/24 1440   Temp 36.1  C (97  F) 04/02/24 1416   Pulse 69 04/02/24 1449   Resp 18 04/02/24 1449   SpO2 93 % 04/02/24 1449   Vitals shown include unfiled device data.    Electronically Signed By: Ronn Buitrago MD  April 2, 2024  2:50 PM

## 2024-04-02 NOTE — CONSULTS
INTERNAL MEDICINE CONSULT    Physician requesting consult: Dr. Haney  Reason for consult: Hypertension, A-fib    Assessment and Plan:  History of paroxysmal atrial fibrillation  In sinus rhythm  Hold off on apixaban until after 2 days per ortho    Hypertension  Continue home propranolol, hydrochlorothiazide with holding parameters    GERD  On Pepcid    Dyslipidemia   diet controlled    History of prediabetes  Follow-up as outpatient    Obesity BMI 38    Left knee osteoarthritis status post left TKA: Postop day #0.  Continue PT OT, pain control, DVT prophylaxis per orthopedic surgery.  Encourage incentive spirometry, monitor hemoglobin    HPI:     Josue Crook is a 68 year old male with past history significant for paroxysmal A-fib on apixaban, hypertension, obesity celiac disease GERD BPH dyslipidemia admitted postoperatively after she underwent a total knee arthroplasty.  Tolerated procedure well.  Estimated blood loss 50 mL. Currently denies any complaints such as nausea vomiting shortness of breath chest pain abdominal pain. Denies any previous history of blood clots. Preop history and physical reviewed.     Medical History  Past Medical History:   Diagnosis Date    Anemia     Arrhythmia     Arthritis     Atherosclerosis of aorta (H24)     BPH (benign prostatic hyperplasia)     Celiac disease     Gastroesophageal reflux disease     Hepatic steatosis     Hyperlipidemia     Hypertension     Obese     Paroxysmal atrial fibrillation (H)     Sinus bradycardia       Patient Active Problem List    Diagnosis Date Noted    Primary osteoarthritis of left knee 04/02/2024     Priority: Medium    Class 2 severe obesity due to excess calories with serious comorbidity in adult (H) 02/01/2024     Priority: Medium    Primary osteoarthritis of one knee, right 09/05/2023     Priority: Medium    Primary hypertension 09/05/2023     Priority: Medium    Gastroesophageal reflux disease without esophagitis 09/05/2023     Priority:  Medium    Status post knee surgery 09/05/2023     Priority: Medium        Surgical History  He  has a past surgical history that includes Arthroplasty knee (Right, 09/05/2023); Cataract Extraction (Bilateral); sinus surgery (01/2023); Foot surgery (Left); and Tonsillectomy (1960).     Past Surgical History:   Procedure Laterality Date    ARTHROPLASTY KNEE Right 09/05/2023    Procedure: RIGHT TOTAL KNEE ARTHROPLASTY;  Surgeon: Julius Haney MD;  Location: [facility] Main OR    CATARACT EXTRACTION Bilateral     right 2013, left 2015    FOOT SURGERY Left     1984    SINUS SURGERY  01/2023    TONSILLECTOMY  1960       Allergies  Allergies   Allergen Reactions    Gluten Meal Other (See Comments)     Celiac's Dx 2023       Prior to Admission Medications   Medications Prior to Admission   Medication Sig Dispense Refill Last Dose    acetaminophen (TYLENOL 8 HOUR ARTHRITIS PAIN) 650 MG CR tablet Take 650-1,300 mg by mouth every 8 hours as needed for pain   Unknown    apixaban ANTICOAGULANT (ELIQUIS) 5 MG tablet Take 5 mg by mouth 2 times daily Last dose 3/28/24 before surgery   3/28/2024 at PM    artificial saliva (BIOTENE MT) AERS spray Take 1 spray by mouth every 6 hours as needed for dry mouth   Unknown    cholecalciferol (VITAMIN D3) 25 mcg (1000 units) capsule Take 2 capsules by mouth daily   3/26/2024 at AM    diphenhydrAMINE-acetaminophen (TYLENOL PM)  MG tablet Take 1 tablet by mouth nightly as needed for sleep   Unknown    famotidine (PEPCID) 40 MG tablet Take 40 mg by mouth At Bedtime   4/1/2024 at PM    hydrochlorothiazide (HYDRODIURIL) 25 MG tablet Take 25 mg by mouth daily as needed (for edema)   Unknown    lactobacillus rhamnosus, GG, (CULTURELL) capsule Take 1 capsule by mouth daily   3/26/2024 at AM    Multiple Vitamins-Minerals (MULTIVITAL PO) Take 1 tablet by mouth daily   3/26/2024 at AM    propranolol (INDERAL) 20 MG tablet Take 1 tablet by mouth 2 times daily   4/2/2024 at AM    tamsulosin  "(FLOMAX) 0.4 MG capsule Take 0.4 mg by mouth every evening   4/1/2024 at PM    zinc gluconate 50 MG tablet Take 50 mg by mouth daily   3/26/2024 at AM       Social History  Reviewed, and he  reports that he has quit smoking. His smoking use included cigarettes. He has been exposed to tobacco smoke. He has never used smokeless tobacco. He reports that he does not currently use alcohol. He reports that he does not use drugs.  Social History     Tobacco Use    Smoking status: Former     Types: Cigarettes     Passive exposure: Past    Smokeless tobacco: Never   Substance Use Topics    Alcohol use: Not Currently       Family History  Reviewed, and not contributory.      Review Of Systems  As per admission HPI, all others reviewed and negative.     Physical Exam:  /53   Pulse 77   Temp 97  F (36.1  C) (Temporal)   Resp 15   Ht 1.721 m (5' 7.75\")   Wt 113.4 kg (250 lb)   SpO2 96%   BMI 38.29 kg/m    General Appearance:  Awake Alert, orientedx3, not in any apparent distress   Head:  Normocephalic, without obvious abnormality   Eyes:  PERRL, conjunctiva/corneas clear   Throat: Oral mucosa moist   Neck: Supple,  no JVD   Lungs:   Clear to auscultation bilaterally, respirations unlabored   Chest Wall:  No tenderness   Heart:  Regular rate and rhythm, S1, S2 normal,no murmur   Abdomen:   Soft, non-tender, bowel sounds present,  no masses, no organomegaly   Extremities: S/p left TKA   Skin: Skin color, texture, turgor normal, no rashes or lesions   Neurologic: Alert and oriented X 3, exam on operated extremity defer to surgery     Results:  Results for orders placed or performed during the hospital encounter of 04/02/24   POC US Guidance Needle Placement     Status: None    Narrative    Ultrasound was performed as guidance to an anesthesia procedure.  Click   \"PACS images\" hyperlink below to view any stored images.  For specific   procedure details, view procedure note authored by anesthesia.   Hemoglobin A1c     " "Status: Abnormal   Result Value Ref Range    Hemoglobin A1C 6.2 (H) <5.7 %     Postoperatively monitor appears in sinus rhythm.    Imaging:   POC US Guidance Needle Placement    Result Date: 4/2/2024  Ultrasound was performed as guidance to an anesthesia procedure.  Click \"PACS images\" hyperlink below to view any stored images.  For specific procedure details, view procedure note authored by anesthesia.        Terri Avilez M.D  [facility]  Internal Medicine    4/2/2024  3:04 PM        "

## 2024-04-02 NOTE — ANESTHESIA CARE TRANSFER NOTE
Patient: Josue Crook    Procedure: Procedure(s):  LEFT TOTAL KNEE ARTHROPLASTY       Diagnosis: Osteoarthritis of left knee [M17.12]  Diagnosis Additional Information: No value filed.    Anesthesia Type:   Spinal     Note:    Oropharynx: oropharynx clear of all foreign objects  Level of Consciousness: drowsy  Oxygen Supplementation: face mask  Level of Supplemental Oxygen (L/min / FiO2): 6  Independent Airway: airway patency satisfactory and stable  Dentition: dentition unchanged  Vital Signs Stable: post-procedure vital signs reviewed and stable  Report to RN Given: handoff report given            Vitals:  Vitals Value Taken Time   /87 04/02/24 1416   Temp     Pulse 75 04/02/24 1419   Resp 15 04/02/24 1419   SpO2 93 % 04/02/24 1419   Vitals shown include unfiled device data.    Electronically Signed By: THERESA KHAN CRNA  April 2, 2024  2:20 PM

## 2024-04-02 NOTE — PHARMACY-ADMISSION MEDICATION HISTORY
Pharmacist VERONICA Medication History    Admission medication history is complete. The information provided in this note is only as accurate as the sources available at the time of the update.    Medication reconciliation/reorder completed by provider prior to medication history? No    Information Source(s): Patient and Clinic records and Care Everywhere via in-person    Pertinent Information: N/A    Allergies reviewed with patient and updates made in EHR: yes    Medications available for use during hospital stay: None.      Medication History Completed By: Wallace Wallace [facility] 4/2/2024 10:04 AM    PTA Med List   Medication Sig Note Last Dose    acetaminophen (TYLENOL 8 HOUR ARTHRITIS PAIN) 650 MG CR tablet Take 650-1,300 mg by mouth every 8 hours as needed for pain  Unknown    apixaban ANTICOAGULANT (ELIQUIS) 5 MG tablet Take 5 mg by mouth 2 times daily Last dose 3/28/24 before surgery 3/27/2024: Stop on 28th 3/28/2024 at PM    artificial saliva (BIOTENE MT) AERS spray Take 1 spray by mouth every 6 hours as needed for dry mouth  Unknown    cholecalciferol (VITAMIN D3) 25 mcg (1000 units) capsule Take 2 capsules by mouth daily  3/26/2024 at AM    diphenhydrAMINE-acetaminophen (TYLENOL PM)  MG tablet Take 1 tablet by mouth nightly as needed for sleep  Unknown    famotidine (PEPCID) 40 MG tablet Take 40 mg by mouth At Bedtime  4/1/2024 at PM    hydrochlorothiazide (HYDRODIURIL) 25 MG tablet Take 25 mg by mouth daily as needed (for edema)  Unknown    lactobacillus rhamnosus, GG, (CULTURELL) capsule Take 1 capsule by mouth daily  3/26/2024 at AM    Multiple Vitamins-Minerals (MULTIVITAL PO) Take 1 tablet by mouth daily  3/26/2024 at AM    propranolol (INDERAL) 20 MG tablet Take 1 tablet by mouth 2 times daily  4/2/2024 at AM    tamsulosin (FLOMAX) 0.4 MG capsule Take 0.4 mg by mouth every evening  4/1/2024 at PM    zinc gluconate 50 MG tablet Take 50 mg by mouth daily  3/26/2024 at AM

## 2024-04-03 ENCOUNTER — APPOINTMENT (OUTPATIENT)
Dept: PHYSICAL THERAPY | Facility: CLINIC | Age: 69
End: 2024-04-03
Attending: ORTHOPAEDIC SURGERY
Payer: COMMERCIAL

## 2024-04-03 VITALS
DIASTOLIC BLOOD PRESSURE: 77 MMHG | RESPIRATION RATE: 18 BRPM | OXYGEN SATURATION: 95 % | WEIGHT: 250 LBS | SYSTOLIC BLOOD PRESSURE: 182 MMHG | HEART RATE: 56 BPM | TEMPERATURE: 97.2 F | HEIGHT: 68 IN | BODY MASS INDEX: 37.89 KG/M2

## 2024-04-03 LAB
HGB BLD-MCNC: 12.4 G/DL (ref 13.3–17.7)
HOLD SPECIMEN: NORMAL

## 2024-04-03 PROCEDURE — 97116 GAIT TRAINING THERAPY: CPT | Mod: GP

## 2024-04-03 PROCEDURE — 250N000011 HC RX IP 250 OP 636: Performed by: ORTHOPAEDIC SURGERY

## 2024-04-03 PROCEDURE — 96372 THER/PROPH/DIAG INJ SC/IM: CPT | Performed by: ORTHOPAEDIC SURGERY

## 2024-04-03 PROCEDURE — 85018 HEMOGLOBIN: CPT | Performed by: ORTHOPAEDIC SURGERY

## 2024-04-03 PROCEDURE — 97161 PT EVAL LOW COMPLEX 20 MIN: CPT | Mod: GP

## 2024-04-03 PROCEDURE — 250N000013 HC RX MED GY IP 250 OP 250 PS 637: Performed by: INTERNAL MEDICINE

## 2024-04-03 PROCEDURE — 36415 COLL VENOUS BLD VENIPUNCTURE: CPT | Performed by: ORTHOPAEDIC SURGERY

## 2024-04-03 PROCEDURE — 250N000013 HC RX MED GY IP 250 OP 250 PS 637: Performed by: ORTHOPAEDIC SURGERY

## 2024-04-03 PROCEDURE — 99214 OFFICE O/P EST MOD 30 MIN: CPT | Performed by: INTERNAL MEDICINE

## 2024-04-03 PROCEDURE — 999N000111 HC STATISTIC OT IP EVAL DEFER

## 2024-04-03 RX ORDER — OXYCODONE HYDROCHLORIDE 5 MG/1
5 TABLET ORAL EVERY 4 HOURS PRN
Qty: 30 TABLET | Refills: 0 | Status: SHIPPED | OUTPATIENT
Start: 2024-04-03

## 2024-04-03 RX ADMIN — CELECOXIB 100 MG: 100 CAPSULE ORAL at 08:43

## 2024-04-03 RX ADMIN — POLYETHYLENE GLYCOL 3350 17 G: 17 POWDER, FOR SOLUTION ORAL at 08:43

## 2024-04-03 RX ADMIN — OXYCODONE 5 MG: 5 TABLET ORAL at 08:43

## 2024-04-03 RX ADMIN — ENOXAPARIN SODIUM 40 MG: 100 INJECTION SUBCUTANEOUS at 06:45

## 2024-04-03 RX ADMIN — ACETAMINOPHEN 975 MG: 325 TABLET ORAL at 03:23

## 2024-04-03 RX ADMIN — ACETAMINOPHEN 975 MG: 325 TABLET ORAL at 11:17

## 2024-04-03 RX ADMIN — CEFAZOLIN SODIUM 2 G: 2 INJECTION, SOLUTION INTRAVENOUS at 03:23

## 2024-04-03 RX ADMIN — SENNOSIDES AND DOCUSATE SODIUM 1 TABLET: 8.6; 5 TABLET ORAL at 08:43

## 2024-04-03 RX ADMIN — PROPRANOLOL HYDROCHLORIDE 20 MG: 20 TABLET ORAL at 08:46

## 2024-04-03 ASSESSMENT — ACTIVITIES OF DAILY LIVING (ADL)
ADLS_ACUITY_SCORE: 23

## 2024-04-03 NOTE — PLAN OF CARE

## 2024-04-03 NOTE — PROGRESS NOTES
Patient vital signs are at baseline: Yes, except elevated BP. Scheduled PO BP administered.   Patient able to ambulate as they were prior to admission or with assist devices provided by therapies during their stay:  Yes  Patient MUST void prior to discharge:  Yes  Patient able to tolerate oral intake:  Yes  Pain has adequate pain control using Oral analgesics:  Yes  Does patient have an identified :  Yes  Has goal D/C date and time been discussed with patient:  Yes    A&Ox4. CMS intact. Rated moderate pain. PRN and scheduled PO pain medications administered. Ice therapy also utilized. Dressing clean, dry and intact. Pt discharged with stabled conditions.

## 2024-04-03 NOTE — PROGRESS NOTES
Care Management Discharge Note    Discharge Date: 04/03/2024       Discharge Disposition: Home    Discharge Services:      Discharge DME:      Discharge Transportation:      Private pay costs discussed: Not applicable    Does the patient's insurance plan have a 3 day qualifying hospital stay waiver?  Yes     Which insurance plan 3 day waiver is available? Alternative insurance waiver    Will the waiver be used for post-acute placement? No    Handoff Referral Completed: Yes    Additional Information:  Chart reviewed, anticipate pt will discharge home today, son will be staying with pt.  No CM needs indicated at this time.    MAAME Trevino

## 2024-04-03 NOTE — PROGRESS NOTES
S Daily Progress Note    Assessment/Plan:  History of paroxysmal atrial fibrillation  In sinus rhythm  Hold off on apixaban until after 2 days per ortho     Hypertension  Continue home propranolol, hydrochlorothiazide with holding parameters     GERD  On Pepcid     Dyslipidemia   diet controlled     History of prediabetes  Follow-up as outpatient     Obesity BMI 38     Left knee osteoarthritis status post left TKA: Postop day #1  Continue PT OT, pain control, DVT prophylaxis per orthopedic surgery.  Encourage incentive spirometry, monitor hemoglobin    Principal Problem:    Primary osteoarthritis of left knee     LOS: 0 days     Subjective:  Doing well.  Denies any complaints.  No shortness of breath, chest pain, urinary or bowel complaints.    Current Facility-Administered Medications   Medication Dose Route Frequency Provider Last Rate Last Admin    acetaminophen (TYLENOL) tablet 975 mg  975 mg Oral Q8H Julius Haney MD   975 mg at 04/03/24 0323    celecoxib (celeBREX) capsule 100 mg  100 mg Oral BID Julius Haney MD   100 mg at 04/02/24 2131    enoxaparin ANTICOAGULANT (LOVENOX) injection 40 mg  40 mg Subcutaneous Q24H Julius Haney MD   40 mg at 04/03/24 0645    famotidine (PEPCID) tablet 40 mg  40 mg Oral At Bedtime Terri Avilez MD   40 mg at 04/02/24 2132    polyethylene glycol (MIRALAX) Packet 17 g  17 g Oral Daily Julius Haney MD        propranolol (INDERAL) tablet 20 mg  20 mg Oral BID Terri Avilez MD   20 mg at 04/02/24 2132    senna-docusate (SENOKOT-S/PERICOLACE) 8.6-50 MG per tablet 1 tablet  1 tablet Oral BID Julius Haney MD   1 tablet at 04/02/24 2131    sodium chloride (PF) 0.9% PF flush 3 mL  3 mL Intracatheter Q8H Julius Haney MD        tamsulosin (FLOMAX) capsule 0.4 mg  0.4 mg Oral QPM Terri Avilez MD   0.4 mg at 04/02/24 1919       Objective:  Vital signs in last 24 hours:  Temp:  [97  F (36.1  C)-97.8  F (36.6  C)] 97.3  F (36.3  C)  Pulse:  [51-80] 58  Resp:   [12-28] 18  BP: (109-171)/(50-87) 148/71  SpO2:  [90 %-100 %] 96 %  Weight:   [unfilled]    Intake/Output last 3 shifts:  I/O last 3 completed shifts:  In: 1420 [P.O.:420; I.V.:1000]  Out: 2150 [Urine:2150]  Intake/Output this shift:  No intake/output data recorded.    Review of Systems:   As per subjective, all others negative.    Physical Exam:    General Appearance:  Alert, cooperative, no distress, appears stated age   Head:  Normocephalic, without obvious abnormality, atraumatic   Eyes:  PERRL,    Neck: Supple   Back:   Symmetric, no curvature, ROM normal, no CVA tenderness   Lungs:   Clear to auscultation bilaterally, respirations unlabored   Chest Wall:  No tenderness or deformity   Heart:  Regular rate and rhythm, S1, S2 normal   Abdomen:   Soft, non tender, non distended, bowel sounds present, no guarding or rigidity   Extremities: S/p left TKA   Skin: Skin color, texture, turgor normal, no rashes or lesions   Neurologic: Alert and oriented X 3, Moves all 4 extremities       Lab Results:  Personally Reviewed.   Recent Results (from the past 24 hour(s))   Hemoglobin A1c    Collection Time: 04/02/24 10:00 AM   Result Value Ref Range    Hemoglobin A1C 6.2 (H) <5.7 %   Creatinine    Collection Time: 04/02/24  3:29 PM   Result Value Ref Range    Creatinine 0.82 0.67 - 1.17 mg/dL    GFR Estimate >90 >60 mL/min/1.73m2   Platelet count - Routine    Collection Time: 04/02/24  4:31 PM   Result Value Ref Range    Platelet Count 247 150 - 450 10e3/uL   Hemoglobin    Collection Time: 04/03/24  6:52 AM   Result Value Ref Range    Hemoglobin 12.4 (L) 13.3 - 17.7 g/dL         Terri Avilez M.D  [facility]  Internal Medicine

## 2024-04-03 NOTE — PROGRESS NOTES
"[facility] Progress Note      Post-operative Day: 1 Day Post-Op    Procedure(s):  LEFT TOTAL KNEE ARTHROPLASTY      Subjective: Patient seen up resting in bedside chair, family at bedside. No acute events overnight. Pain tolerable on PO analgesics. Tolerating a regular diet with no nausea or vomiting. Voiding without difficulty and passing gas. Feeling ready to discharge home.     Pain: mild/moderate  Chest pain, SOB:  No      Objective:  Blood pressure (!) 182/77, pulse 56, temperature 97.2  F (36.2  C), temperature source Oral, resp. rate 18, height 1.721 m (5' 7.75\"), weight 113.4 kg (250 lb), SpO2 95%.    Patient Vitals for the past 24 hrs:   BP Temp Temp src Pulse Resp SpO2   04/03/24 0843 (!) 182/77 97.2  F (36.2  C) Oral 56 18 95 %   04/03/24 0347 (!) 148/71 -- -- -- -- --   04/03/24 0344 (!) 171/80 97.3  F (36.3  C) Oral 58 18 96 %   04/02/24 2337 (!) 144/65 97.2  F (36.2  C) Oral 58 19 94 %   04/02/24 2122 (!) 168/77 97  F (36.1  C) Oral 57 18 91 %   04/02/24 1900 (!) 155/72 97.8  F (36.6  C) Oral 65 17 93 %   04/02/24 1800 135/65 97.3  F (36.3  C) Oral 60 16 93 %   04/02/24 1700 (!) 157/75 97.2  F (36.2  C) Oral 62 17 93 %   04/02/24 1639 -- -- -- -- 16 94 %   04/02/24 1630 (!) 162/69 97.2  F (36.2  C) Oral 66 12 93 %   04/02/24 1600 (!) 152/74 97.2  F (36.2  C) Oral 56 14 96 %   04/02/24 1510 130/64 -- -- 68 15 95 %   04/02/24 1505 130/64 -- -- 70 15 95 %   04/02/24 1500 128/67 -- -- 68 19 93 %   04/02/24 1450 123/69 -- -- 65 12 93 %   04/02/24 1440 124/69 -- -- 71 20 94 %   04/02/24 1430 121/66 -- -- 71 27 96 %   04/02/24 1420 112/53 -- -- 77 15 96 %   04/02/24 1416 119/87 97  F (36.1  C) Temporal 80 17 90 %   04/02/24 1200 131/59 97.2  F (36.2  C) -- 54 18 100 %   04/02/24 1145 120/59 97.2  F (36.2  C) -- 54 15 100 %   04/02/24 1130 121/58 97.3  F (36.3  C) -- 54 27 99 %       Wt Readings from Last 4 Encounters:   04/02/24 113.4 kg (250 lb)   02/01/24 112 kg (247 lb)   09/05/23 108.8 kg " (239 lb 14.4 oz)   10/24/22 110.7 kg (244 lb)       General: Alert and orientated, NAD  Respiratory: Non-labored breathing on RA  LLE motor function, sensation, and circulation intact   Yes, Dorsiflexion/plantarflexion intact and equal bilaterally. Moves all other extremities with ease and purpose   Wound status: incisions are clean dry and intact. No  Calf tenderness: Bilateral  No    Pertinent Labs   Lab Results: personally reviewed.     Recent Labs   Lab Test 04/03/24  0652 04/02/24  1631 03/19/24  0812 01/18/24  0829 09/06/23  0714 08/22/23  0910 04/26/23  1130   WBC  --   --  8.1  --   --  7.7 9.2   HGB 12.4*  --  13.3  --  12.6* 13.4 13.6   HCT  --   --  40.2  --   --  42.5 45.1   MCV  --   --  95  --   --  99 93   PLT  --  247 283  --   --  281 309   NA  --   --  140 138  --  140 139       Plan:   Anticoagulation protocol:Lovenox today then may  Resume pre op Eliquis  4/4/24  Pain medications:  scopainmedication: oxycodone and tylenol, will avoid NSAIDs given blood thinner  Weight bearing status:  WBAT  Disposition:  Home today pending therapies and clearance from hospitalist    Continue cares and rehabilitation     Report completed by:  THERESA Jo CNP  Date: 4/3/2024  Time: 11:26 AM

## 2024-04-03 NOTE — PLAN OF CARE
Pt is A&Ox4. Pt rated pain 4-6/10 during shift thus far. Managed with prn and scheduled medications. No new skin issues noted. Dressing is CDI. Sensation at baseline per pt. A1 with walker for transferring. IVF infusing. Voiding adequately. Education on medication administration, alarms, and use of call-light to reduce risk for falls and injury. VSS, denies chest pain, shortness of breath, and nausea. Pt hoping to discharge home with family after PT/OT.    Patient vital signs are at baseline: Yes  Patient able to ambulate as they were prior to admission or with assist devices provided by therapies during their stay:  Yes  Patient MUST void prior to discharge:  Yes  Patient able to tolerate oral intake:  Yes  Pain has adequate pain control using Oral analgesics:  Yes  Does patient have an identified :  Yes  Has goal D/C date and time been discussed with patient:  Yes

## 2024-04-03 NOTE — PROGRESS NOTES
IV removed. Pt and family understood discharge instructions as evidence by nodding and asking questions.     IV removed.

## 2024-04-03 NOTE — PROGRESS NOTES
04/03/24 5929   Appointment Info   Signing Clinician's Name / Credentials (PT) Mauricio Frazier, PT, DPT   Quick Adds   Quick Adds Certification   Living Environment   People in Home alone;child(kermit), adult   Current Living Arrangements house   Home Accessibility no concerns   Living Environment Comments Son staying with pt for a week, stairs to basement when pt comfortable to do   Self-Care   Usual Activity Tolerance good   Current Activity Tolerance moderate   Equipment Currently Used at Home none   Fall history within last six months no   General Information   Onset of Illness/Injury or Date of Surgery 04/02/24   Referring Physician Dr. Haney   Patient/Family Therapy Goals Statement (PT) Decrease pain with mobility   Pertinent History of Current Problem (include personal factors and/or comorbidities that impact the POC) s/p L TKA   Existing Precautions/Restrictions weight bearing   Weight-Bearing Status - LLE weight-bearing as tolerated   Weight-Bearing Status - RLE full weight-bearing   Range of Motion (ROM)   ROM Comment WFL, decreased LLE s/p TKA   Strength (Manual Muscle Testing)   Strength Comments WFL   Transfers   Transfers sit-stand transfer   Sit-Stand Transfer   Sit-Stand [place] (Transfers) supervision   Assistive Device (Sit-Stand Transfers) walker, front-wheeled   Gait/Stairs (Locomotion)   [place] Level (Gait) supervision   Assistive Device (Gait) walker, front-wheeled   Distance in Feet (Gait) 10'   Pattern (Gait) step-through   Deviations/Abnormal Patterns (Gait) tasha decreased;stride length decreased   Clinical Impression   Criteria for Skilled Therapeutic Intervention Yes, treatment indicated   PT Diagnosis (PT) impaired functional mobility   Influenced by the following impairments pain, decreased ROM, decreased strength   Functional limitations due to impairments gait, transfers   Clinical Presentation (PT Evaluation Complexity) stable   Clinical Presentation Rationale pt presents  as medically diagnosed   Clinical Decision Making (Complexity) low complexity   Planned Therapy Interventions (PT) gait training;home exercise program;patient/family education;ROM (range of motion);strengthening;transfer training   Risk & Benefits of therapy have been explained care plan/treatment goals reviewed;patient;son   PT Total Evaluation Time   PT Eval, Low Complexity Minutes (29676) 10   Therapy Certification   Start of care date 04/03/24   Certification date from 04/03/24   Certification date to 05/03/24   Medical Diagnosis s/p TKA   Physical Therapy Goals   PT Frequency One time eval and treatment only   PT Predicted Duration/Target Date for Goal Attainment 04/03/24   PT Goals PT Goal 1;Transfers;Gait   PT: Transfers Modified independent;Sit to/from stand;Assistive device;Goal Met   PT: Gait Modified independent;Rolling walker;150 feet;Goal Met   PT: Goal 1 Independent with TKA HEP, Goal Met   Interventions   Interventions Quick Adds Therapeutic Procedure;Therapeutic Activity;Gait Training   Therapeutic Procedure/Exercise   Ther. Procedure: strength, endurance, ROM, flexibillity Minutes (31168) 5   Symptoms Noted During/After Treatment none   Treatment Detail/Skilled Intervention Reviewed TKA protocol, Independent following education   Therapeutic Activity   Therapeutic Activities: dynamic activities to improve functional performance Minutes (90338) 3   Symptoms Noted During/After Treatment None   Treatment Detail/Skilled Intervention sit to/from stand, cueing for safe hand placement and LE positioning, Mod I following education. Able to don pants Mod I.   Gait Training   Gait Training Minutes (24997) 10   Symptoms Noted During/After Treatment (Gait Training) none   Treatment Detail/Skilled Intervention Pt amb well in the halls using FWW and SBA, no LOB or adverse s/s. Educated on walking program, role of therapies, icing, POC.   Distance in Feet 150'   [place] Level (Gait Training) stand-by assist    Physical Assistance Level (Gait Training) supervision;verbal cues   Weight Bearing (Gait Training) weight-bearing as tolerated   Assistive Device (Gait Training) rolling walker   Pattern Analysis (Gait Training) swing-through gait   Gait Analysis Deviations decreased tasha;decreased step length   Impairments (Gait Analysis/Training) pain;strength decreased   PT Discharge Planning   PT Plan d/c PT   PT Discharge Recommendation (DC Rec) other (see comments)   PT Rationale for DC Rec defer to ortho   PT Brief overview of current status SBA transfers and amb 150' with FWW   [facility]  OUTPATIENT PHYSICAL THERAPY EVALUATION  PLAN OF TREATMENT FOR OUTPATIENT REHABILITATION  (COMPLETE FOR INITIAL CLAIMS ONLY)  Patient's Last Name, First Name, M.I.  YOB: 1955  Josue Crook                        Provider's Name  [facility] Medical Record No.  [id number]                             Onset Date:  (P) 04/02/24   Start of Care Date:  (P) 04/03/24   Type:     _X_PT   ___OT   ___SLP Medical Diagnosis:  (P) s/p TKA              PT Diagnosis:  (P) impaired functional mobility Visits from SOC:  1     See note for plan of treatment, functional goals and certification details    I CERTIFY THE NEED FOR THESE SERVICES FURNISHED UNDER        THIS PLAN OF TREATMENT AND WHILE UNDER MY CARE     (Physician co-signature of this document indicates review and certification of the therapy plan).

## 2024-04-03 NOTE — PROGRESS NOTES
OT: Orders received. Chart reviewed and discussed with care team.  OT not indicated due to pt having previous knee surgery, has good home setup/support from family, and pt is Mod I w/ ADLs, per PT.  Defer discharge recommendations to ortho team.  Will complete orders.

## 2024-04-03 NOTE — DISCHARGE SUMMARY
[facility] Discharge Summary                                  [facility]     TRAVIS RODRIGUEZ [id number]   Age: 68 year old  PCP: Inge Alfaro, [phone] 1955     Date of Admission:  4/2/2024  Date of Discharge::  4/3/2024  Discharge Provider:  THERESA Jo CNP    Code status:  Full Code    Admission Information:  Admission Diagnosis:  Osteoarthritis of left knee [M17.12]  Primary osteoarthritis of left knee [M17.12]    Post-Operative Day: 1 Day Post-Op     Reason for admission:  The patient was admitted for the following:Procedure(s) (LRB):  LEFT TOTAL KNEE ARTHROPLASTY (Left)    Principal Problem:    Primary osteoarthritis of left knee      Allergies:  Gluten meal    Following the procedure noted above the patient was transferred to the post-op floor and started on:    Therapy:  physical therapy and occupational therapy  Anticoagulation Plan:  Lovenox today then may resume PTA Eliquis     Pain Management: scopainmedication: oxycodone and tylenol, will avoid NSAIDs given blood thinner  Weight bearing status: Weight bearing as tolerated     The patient was followed by Orthopedics during the inpatient treatment course:  Complications:  None  Additional consultations:  hospitalist      Pertinent Labs   Lab Results: personally reviewed.     Recent Labs   Lab Test 04/03/24  0652 04/02/24  1631 03/19/24  0812 01/18/24  0829 09/06/23  0714 08/22/23  0910 04/26/23  1130   WBC  --   --  8.1  --   --  7.7 9.2   HGB 12.4*  --  13.3  --  12.6* 13.4 13.6   HCT  --   --  40.2  --   --  42.5 45.1   MCV  --   --  95  --   --  99 93   PLT  --  247 283  --   --  281 309   NA  --   --  140 138  --  140 139          Discharge Information:  Condition at discharge: Stable  Discharge destination:  Discharged to home     Medications at discharge:  Current Discharge Medication List        START taking these medications    Details   hydrOXYzine HCl (ATARAX) 10 MG tablet Take 1 tablet (10  mg) by mouth every 6 hours as needed for other (adjuvant pain)  Qty: 25 tablet, Refills: 0    Associated Diagnoses: Primary osteoarthritis of left knee      oxyCODONE (ROXICODONE) 5 MG tablet Take 1 tablet (5 mg) by mouth every 4 hours as needed for moderate pain  Qty: 30 tablet, Refills: 0    Associated Diagnoses: Primary osteoarthritis of left knee; Status post knee surgery           CONTINUE these medications which have CHANGED    Details   apixaban ANTICOAGULANT (ELIQUIS) 5 MG tablet Take 1 tablet (5 mg) by mouth 2 times daily Last dose 3/28/24 before surgery    Comments: May Resume 4/4/24           CONTINUE these medications which have NOT CHANGED    Details   acetaminophen (TYLENOL 8 HOUR ARTHRITIS PAIN) 650 MG CR tablet Take 650-1,300 mg by mouth every 8 hours as needed for pain      artificial saliva (BIOTENE MT) AERS spray Take 1 spray by mouth every 6 hours as needed for dry mouth      cholecalciferol (VITAMIN D3) 25 mcg (1000 units) capsule Take 2 capsules by mouth daily      diphenhydrAMINE-acetaminophen (TYLENOL PM)  MG tablet Take 1 tablet by mouth nightly as needed for sleep      famotidine (PEPCID) 40 MG tablet Take 40 mg by mouth At Bedtime      hydrochlorothiazide (HYDRODIURIL) 25 MG tablet Take 25 mg by mouth daily as needed (for edema)      lactobacillus rhamnosus, GG, (CULTURELL) capsule Take 1 capsule by mouth daily      Multiple Vitamins-Minerals (MULTIVITAL PO) Take 1 tablet by mouth daily      propranolol (INDERAL) 20 MG tablet Take 1 tablet by mouth 2 times daily      tamsulosin (FLOMAX) 0.4 MG capsule Take 0.4 mg by mouth every evening      zinc gluconate 50 MG tablet Take 50 mg by mouth daily                        Follow-Up Care:  Patient should be seen in the office in 14 days by the Orthopedic Surgeon/Physician Assistant.  Call [phone] for appointment or questions.    It was my pleasure to take care of the above patient.  THERESA Jo CNP

## 2024-04-30 ENCOUNTER — TELEPHONE (OUTPATIENT)
Dept: CARDIOLOGY | Facility: CLINIC | Age: 69
End: 2024-04-30
Payer: COMMERCIAL

## 2024-04-30 NOTE — TELEPHONE ENCOUNTER
[facility] Call Center    Phone Message    May a detailed message be left on voicemail: yes     Reason for Call: Other: [facility] calling to request 2 day  hold and bridge orders for Eliquis,  prior to procedure 5/3if unable to do a hold, will requet creatine labs within 90 days     Action Taken: Other: cardiology    Travel Screening: Not Applicable    Thank you!  [facility]

## 2024-04-30 NOTE — TELEPHONE ENCOUNTER
Dr. Feng,  See note from [facility] below.  Okay for patient to hold Eliquis for 2 days prior to GI procedure?  Thank you,  Leatha

## 2024-05-01 NOTE — TELEPHONE ENCOUNTER
Recommendations called to [facility].  M for pt stating recommendations from Dr Feng, and they were discussed with [facility] already, asked for call back with questions.  -ral    ----- Message -----  From: Raleigh Feng MD  Sent: 5/1/2024   3:54 PM CDT  To: Leatha Mejía RN    He is at low risk to stop Eliquis prior to GI evaluation.  Okay to stop for 3 days ahead of his procedure.  Resume 2 days afterwards.  Thank you. cmc

## 2025-01-24 ENCOUNTER — LAB REQUISITION (OUTPATIENT)
Dept: LAB | Facility: CLINIC | Age: 70
End: 2025-01-24
Payer: COMMERCIAL

## 2025-01-24 DIAGNOSIS — E78.5 HYPERLIPIDEMIA, UNSPECIFIED: ICD-10-CM

## 2025-01-24 DIAGNOSIS — E55.9 VITAMIN D DEFICIENCY, UNSPECIFIED: ICD-10-CM

## 2025-01-24 LAB
ALBUMIN SERPL BCG-MCNC: 3.8 G/DL (ref 3.5–5.2)
ALP SERPL-CCNC: 112 U/L (ref 40–150)
ALT SERPL W P-5'-P-CCNC: 20 U/L (ref 0–70)
ANION GAP SERPL CALCULATED.3IONS-SCNC: 9 MMOL/L (ref 7–15)
AST SERPL W P-5'-P-CCNC: 18 U/L (ref 0–45)
BILIRUB SERPL-MCNC: 0.3 MG/DL
BUN SERPL-MCNC: 11.3 MG/DL (ref 8–23)
CALCIUM SERPL-MCNC: 9.7 MG/DL (ref 8.8–10.4)
CHLORIDE SERPL-SCNC: 103 MMOL/L (ref 98–107)
CHOLEST SERPL-MCNC: 169 MG/DL
CREAT SERPL-MCNC: 0.85 MG/DL (ref 0.67–1.17)
EGFRCR SERPLBLD CKD-EPI 2021: >90 ML/MIN/1.73M2
FASTING STATUS PATIENT QL REPORTED: YES
FASTING STATUS PATIENT QL REPORTED: YES
GLUCOSE SERPL-MCNC: 101 MG/DL (ref 70–99)
HCO3 SERPL-SCNC: 27 MMOL/L (ref 22–29)
HDLC SERPL-MCNC: 46 MG/DL
LDLC SERPL CALC-MCNC: 106 MG/DL
NONHDLC SERPL-MCNC: 123 MG/DL
POTASSIUM SERPL-SCNC: 4.5 MMOL/L (ref 3.4–5.3)
PROT SERPL-MCNC: 6.9 G/DL (ref 6.4–8.3)
SODIUM SERPL-SCNC: 139 MMOL/L (ref 135–145)
T4 FREE SERPL-MCNC: 1.32 NG/DL (ref 0.9–1.7)
TRIGL SERPL-MCNC: 85 MG/DL
TSH SERPL DL<=0.005 MIU/L-ACNC: 2.27 UIU/ML (ref 0.3–4.2)

## 2025-01-24 PROCEDURE — 80053 COMPREHEN METABOLIC PANEL: CPT | Mod: ORL | Performed by: NURSE PRACTITIONER

## 2025-01-24 PROCEDURE — 84443 ASSAY THYROID STIM HORMONE: CPT | Mod: ORL | Performed by: NURSE PRACTITIONER

## 2025-01-24 PROCEDURE — 84439 ASSAY OF FREE THYROXINE: CPT | Mod: ORL | Performed by: NURSE PRACTITIONER

## 2025-01-24 PROCEDURE — 80061 LIPID PANEL: CPT | Mod: ORL | Performed by: NURSE PRACTITIONER

## 2025-03-15 ENCOUNTER — HEALTH MAINTENANCE LETTER (OUTPATIENT)
Age: 70
End: 2025-03-15

## (undated) DEVICE — BLADE SAW SAGITTAL STRK WIDE 25.4X85X1.2MM 2108-151-000

## (undated) DEVICE — GLOVE BIOGEL PI SZ 7.0 40870

## (undated) DEVICE — SOL NACL 0.9% IRRIG 1000ML BOTTLE 2F7124

## (undated) DEVICE — DRSG AQUACEL AG HYDROFIBER  3.5X10" 422605

## (undated) DEVICE — A3 SUPPLIES- SEE NURSING INFO PAGE

## (undated) DEVICE — SUCTION MANIFOLD NEPTUNE 2 SYS 4 PORT 0702-020-000

## (undated) DEVICE — CAST PADDING 6" STERILE 9046S

## (undated) DEVICE — STPL SKIN PROXIMATE 35 WIDE PMW35

## (undated) DEVICE — SUTURE VICRYL+ 1 18 CT/CR  VLT VCP753D

## (undated) DEVICE — GLOVE BIOGEL PI SZ 8.0 40880

## (undated) DEVICE — HOLDER LIMB VELCRO OR 0814-1533

## (undated) DEVICE — PLATE GROUNDING ADULT W/CORD 9165L

## (undated) DEVICE — SOLUTION IRRIG 2B7127 .9NS 3000ML BAG

## (undated) DEVICE — BLADE SAW SAGITTAL STRK DUAL CUT 4118-135-090

## (undated) DEVICE — SU ETHIBOND 1 CT-1 30" X425H

## (undated) DEVICE — DECANTER VIAL 2006S

## (undated) DEVICE — CUSTOM PACK TOTAL KNEE SOP5BTKHEC

## (undated) DEVICE — GLOVE BIOGEL INDICATOR 7.5 LF 41675

## (undated) DEVICE — SUTURE VICRYL+ 2-0 27IN CT-1 UND VCP259H

## (undated) DEVICE — GLOVE BIOGEL PI INDICATOR 8.0 LF 41680

## (undated) DEVICE — SOL WATER IRRIG 1000ML BOTTLE 2F7114

## (undated) DEVICE — CUSTOM PACK TOTAL KNEE ACCESSORY SOP5BTAHEA

## (undated) DEVICE — CAST PADDING 6IN COTTON WEBRIL STERILE 2554

## (undated) DEVICE — ELECTRODE PATIENT RETURN ADULT L10 FT 2 PLATE CORD 0855C

## (undated) RX ORDER — ONDANSETRON 2 MG/ML
INJECTION INTRAMUSCULAR; INTRAVENOUS
Status: DISPENSED
Start: 2023-09-05

## (undated) RX ORDER — PROPOFOL 10 MG/ML
INJECTION, EMULSION INTRAVENOUS
Status: DISPENSED
Start: 2023-09-05

## (undated) RX ORDER — DEXAMETHASONE SODIUM PHOSPHATE 10 MG/ML
INJECTION, EMULSION INTRAMUSCULAR; INTRAVENOUS
Status: DISPENSED
Start: 2024-04-02

## (undated) RX ORDER — LIDOCAINE HYDROCHLORIDE 10 MG/ML
INJECTION, SOLUTION EPIDURAL; INFILTRATION; INTRACAUDAL; PERINEURAL
Status: DISPENSED
Start: 2024-04-02

## (undated) RX ORDER — ONDANSETRON 2 MG/ML
INJECTION INTRAMUSCULAR; INTRAVENOUS
Status: DISPENSED
Start: 2024-04-02

## (undated) RX ORDER — GLYCOPYRROLATE 0.2 MG/ML
INJECTION, SOLUTION INTRAMUSCULAR; INTRAVENOUS
Status: DISPENSED
Start: 2023-09-05

## (undated) RX ORDER — DEXAMETHASONE SODIUM PHOSPHATE 10 MG/ML
INJECTION, EMULSION INTRAMUSCULAR; INTRAVENOUS
Status: DISPENSED
Start: 2023-09-05

## (undated) RX ORDER — FENTANYL CITRATE-0.9 % NACL/PF 10 MCG/ML
PLASTIC BAG, INJECTION (ML) INTRAVENOUS
Status: DISPENSED
Start: 2024-04-02

## (undated) RX ORDER — GLYCOPYRROLATE 0.2 MG/ML
INJECTION, SOLUTION INTRAMUSCULAR; INTRAVENOUS
Status: DISPENSED
Start: 2024-04-02

## (undated) RX ORDER — PROPOFOL 10 MG/ML
INJECTION, EMULSION INTRAVENOUS
Status: DISPENSED
Start: 2024-04-02

## (undated) RX ORDER — LIDOCAINE HYDROCHLORIDE 10 MG/ML
INJECTION, SOLUTION EPIDURAL; INFILTRATION; INTRACAUDAL; PERINEURAL
Status: DISPENSED
Start: 2023-09-05

## (undated) RX ORDER — PHENYLEPHRINE HYDROCHLORIDE 10 MG/ML
INJECTION INTRAVENOUS
Status: DISPENSED
Start: 2023-09-05